# Patient Record
Sex: MALE | Race: BLACK OR AFRICAN AMERICAN | Employment: UNEMPLOYED | RURAL
[De-identification: names, ages, dates, MRNs, and addresses within clinical notes are randomized per-mention and may not be internally consistent; named-entity substitution may affect disease eponyms.]

---

## 2017-02-10 DIAGNOSIS — M17.12 PRIMARY OSTEOARTHRITIS OF LEFT KNEE: ICD-10-CM

## 2017-02-13 RX ORDER — TRAMADOL HYDROCHLORIDE AND ACETAMINOPHEN 37.5; 325 MG/1; MG/1
TABLET ORAL
Qty: 60 TAB | Refills: 0 | Status: SHIPPED | OUTPATIENT
Start: 2017-02-13 | End: 2017-08-15

## 2017-04-17 ENCOUNTER — OFFICE VISIT (OUTPATIENT)
Dept: FAMILY MEDICINE CLINIC | Age: 57
End: 2017-04-17

## 2017-04-17 VITALS
WEIGHT: 165 LBS | OXYGEN SATURATION: 96 % | TEMPERATURE: 98.2 F | DIASTOLIC BLOOD PRESSURE: 92 MMHG | SYSTOLIC BLOOD PRESSURE: 144 MMHG | HEART RATE: 56 BPM | BODY MASS INDEX: 25.9 KG/M2 | HEIGHT: 67 IN | RESPIRATION RATE: 18 BRPM

## 2017-04-17 DIAGNOSIS — R73.03 PRE-DIABETES: ICD-10-CM

## 2017-04-17 DIAGNOSIS — I10 ESSENTIAL HYPERTENSION: Primary | ICD-10-CM

## 2017-04-17 DIAGNOSIS — E78.5 HYPERLIPIDEMIA, UNSPECIFIED HYPERLIPIDEMIA TYPE: ICD-10-CM

## 2017-04-17 DIAGNOSIS — Z23 ENCOUNTER FOR IMMUNIZATION: ICD-10-CM

## 2017-04-17 NOTE — PROGRESS NOTES
Reviewed record in preparation for visit and have necessary documentation  Pt did not bring medication to office visit for review  Opportunity was given for questions  Goals that were addressed and/or need to be completed after this appointment include   Health Maintenance Due   Topic Date Due    Pneumococcal 19-64 Medium Risk (1 of 1 - PPSV23) 12/27/1979    DTaP/Tdap/Td series (1 - Tdap) 12/27/1981

## 2017-04-17 NOTE — PATIENT INSTRUCTIONS

## 2017-04-18 LAB
ALBUMIN SERPL-MCNC: 4 G/DL (ref 3.5–5.5)
ALBUMIN/GLOB SERPL: 1.3 {RATIO} (ref 1.2–2.2)
ALP SERPL-CCNC: 55 IU/L (ref 39–117)
ALT SERPL-CCNC: 16 IU/L (ref 0–44)
AST SERPL-CCNC: 21 IU/L (ref 0–40)
BILIRUB SERPL-MCNC: 0.3 MG/DL (ref 0–1.2)
BUN SERPL-MCNC: 12 MG/DL (ref 6–24)
BUN/CREAT SERPL: 10 (ref 9–20)
CALCIUM SERPL-MCNC: 8.8 MG/DL (ref 8.7–10.2)
CHLORIDE SERPL-SCNC: 100 MMOL/L (ref 96–106)
CHOLEST SERPL-MCNC: 162 MG/DL (ref 100–199)
CO2 SERPL-SCNC: 25 MMOL/L (ref 18–29)
CREAT SERPL-MCNC: 1.15 MG/DL (ref 0.76–1.27)
EST. AVERAGE GLUCOSE BLD GHB EST-MCNC: 131 MG/DL
GLOBULIN SER CALC-MCNC: 3 G/DL (ref 1.5–4.5)
GLUCOSE SERPL-MCNC: 81 MG/DL (ref 65–99)
HBA1C MFR BLD: 6.2 % (ref 4.8–5.6)
HCT VFR BLD AUTO: 40.4 % (ref 37.5–51)
HDLC SERPL-MCNC: 67 MG/DL
HGB BLD-MCNC: 13 G/DL (ref 12.6–17.7)
LDLC SERPL CALC-MCNC: 70 MG/DL (ref 0–99)
POTASSIUM SERPL-SCNC: 4.1 MMOL/L (ref 3.5–5.2)
PROT SERPL-MCNC: 7 G/DL (ref 6–8.5)
SODIUM SERPL-SCNC: 136 MMOL/L (ref 134–144)
TRIGL SERPL-MCNC: 127 MG/DL (ref 0–149)
TSH SERPL DL<=0.005 MIU/L-ACNC: 0.93 UIU/ML (ref 0.45–4.5)
VLDLC SERPL CALC-MCNC: 25 MG/DL (ref 5–40)

## 2017-04-24 NOTE — PROGRESS NOTES
Chief Complaint   Patient presents with    Hypertension    Cholesterol Problem    Other     Requesting MRI to check on filter     he is a 64y.o. year old male who presents for follow up chronic medical conditions. Patient with hx of CVA with left sided hemiparesis. Patient denies HA, dizziness, SOB, CP, abdominal pain, dysuria, acute myalgias or arthralgias. Hypertension:  The patient reports:  taking medications as instructed, no medication side effects noted, no TIA's, no chest pain on exertion, no dyspnea on exertion, no swelling of ankles. Lifestyle modification/social history: sedentary  BP Readings from Last 3 Encounters:   04/17/17 (!) 144/92   12/15/16 135/84   08/31/16 132/87     Lab Results   Component Value Date/Time    Sodium 136 04/17/2017 12:42 PM    Potassium 4.1 04/17/2017 12:42 PM    Chloride 100 04/17/2017 12:42 PM    CO2 25 04/17/2017 12:42 PM    Glucose 81 04/17/2017 12:42 PM    BUN 12 04/17/2017 12:42 PM    Creatinine 1.15 04/17/2017 12:42 PM    BUN/Creatinine ratio 10 04/17/2017 12:42 PM    GFR est AA 82 04/17/2017 12:42 PM    GFR est non-AA 71 04/17/2017 12:42 PM    Calcium 8.8 04/17/2017 12:42 PM     Patient advised to log blood pressures at home 2-3 times weekly and bring to next visit. Call office as soon as possible if BP's over 140/90 on multiple occasions or with symptoms of dizziness, chest pain, shortness of breath, headache or ankle swelling. Our goal is to normalize the blood pressure to decrease the risks of strokes and heart attacks. The patient is in agreement with the plan. Hyperlipidemia      Lab Results   Component Value Date/Time    Cholesterol, total 162 04/17/2017 12:42 PM    HDL Cholesterol 67 04/17/2017 12:42 PM    LDL, calculated 70 04/17/2017 12:42 PM    Triglyceride 127 04/17/2017 12:42 PM     Lab Results   Component Value Date/Time    ALT (SGPT) 16 04/17/2017 12:42 PM    AST (SGOT) 21 04/17/2017 12:42 PM    Alk.  phosphatase 55 04/17/2017 12:42 PM Bilirubin, total 0.3 04/17/2017 12:42 PM     Our goal is to lower hyperlipidemia to decrease the risks of strokes and heart attacks      Patient Active Problem List   Diagnosis Code    HTN (hypertension) I10    H/O: CVA (cerebrovascular accident) Z80.78    Alcohol abuse F10.10    CHF (congestive heart failure) ([facility]) I50.9    Osteoarthritis, knee M17.10    Knee pain M25.569    Arthritis of knee M17.10    GERD (gastroesophageal reflux disease) K21.9    Neutropenia ([address] D70.9    PE (pulmonary embolism) I26.99    Hyperlipidemia E78.5    Paralysis ([facility]) G83.9    Cough R05     History reviewed. No pertinent surgical history. Social History     Social History    Marital status: LEGALLY      Spouse name: N/A    Number of children: N/A    Years of education: N/A     Occupational History    Not on file. Social History Main Topics    Smoking status: Former Smoker     Types: Cigars     Quit date: 1/1/2007    Smokeless tobacco: Never Used    Alcohol use Yes      Comment: beer occasionally    Drug use: No    Sexual activity: Yes     Other Topics Concern    Not on file     Social History Narrative     Family History   Problem Relation Age of Onset    Hypertension Mother      Current Outpatient Prescriptions   Medication Sig    traMADol-acetaminophen (ULTRACET) 37.5-325 mg per tablet 1-2 tabs every 8 hours as needed.   Indications: Pain    doxazosin (CARDURA) 2 mg tablet TAKE ONE TABLET BY MOUTH ONCE DAILY    baclofen (LIORESAL) 10 mg tablet TAKE ONE TABLET BY MOUTH TWICE DAILY    famotidine (PEPCID) 20 mg tablet TAKE 1 TABLET BY MOUTH ONCE A DAY AT NIGHT    triamterene-hydroCHLOROthiazide (MAXZIDE) 37.5-25 mg per tablet TAKE ONE TABLET BY MOUTH ONCE DAILY    amLODIPine (NORVASC) 10 mg tablet TAKE ONE TABLET BY MOUTH ONCE DAILY    irbesartan (AVAPRO) 150 mg tablet TAKE ONE TABLET BY MOUTH ONCE DAILY    pravastatin (PRAVACHOL) 40 mg tablet TAKE ONE TABLET BY MOUTH ONCE DAILY    naproxen (NAPROSYN) 500 mg tablet Take 1 Tab by mouth two (2) times daily (with meals).  Omeprazole delayed release (PRILOSEC D/R) 20 mg tablet Take 1 Tab by mouth daily.  cetirizine (ZYRTEC) 10 mg tablet Take 1 Tab by mouth daily as needed for Allergies. No current facility-administered medications for this visit. Allergies   Allergen Reactions    Ace Inhibitors Other (comments)     Neutropenia      Zestril [Lisinopril] Other (comments)     neutropenia       Review of Systems:  Constitutional: feeling well, denies fatigue, malaise  Skin: Negative for rash or lesion  HEENT: Negative for acute hearing or vision changes  Respiratory: Negative for cough, wheezing or SOB  Cardiovascular: Negative for chest pain, dizziness or palpitations  Gastrointestinal: Negative for nausea or abdominal pain  Genital/urinary: Negative for dysuria or voiding dysfunction  Musculoskeletal: Negative for acute myalgia or arthralgia   Neurological: see HPI, Negative for HA  Psychlogical: Negative for depression or anxiety     Vitals:    04/17/17 0910   BP: (!) 144/92   Pulse: (!) 56   Resp: 18   Temp: 98.2 °F (36.8 °C)   TempSrc: Oral   SpO2: 96%   Weight: 165 lb (74.8 kg)   Height: 5' 7\" (1.702 m)       Physical Examination:  General: Well developed, well nourished, in no acute distress  Skin: Warm and dry, no rash or lesion appreciated  Head: Normocephalic, atraumatic  Eyes: Sclera clear, EOMI  Neck: Normal range of motion  Respiratory: Clear to auscultation bilaterally with symmetrical effort  Cardiovascular: Normal S1, S2, Regular rate and rhythm  Extremities: decreased left sided strength, antalgic gait   Neurological: left hemiparesis  Psychological: Active, alert and oriented. Affect appropriate     Gorge Dixon was seen today for hypertension, cholesterol problem and other.     Diagnoses and all orders for this visit:    Essential hypertension  -     TSH 3RD GENERATION  -     HGB & HCT    Hyperlipidemia, unspecified hyperlipidemia type  -     METABOLIC PANEL, COMPREHENSIVE  -     LIPID PANEL  -     HGB & HCT    Pre-diabetes  -     METABOLIC PANEL, COMPREHENSIVE  -     HEMOGLOBIN A1C WITH EAG    Encounter for immunization  -     pneumococcal 23-valent (PNEUMOVAX 23) 25 mcg/0.5 mL injection; 0.5 mL by IntraMUSCular route once for 1 dose. I have discussed the diagnosis with the patient and the intended plan as seen in the above orders. The patient expresses understanding and agreement with our plan of care. All of the patient's questions were answered to apparent satisfaction. The patient has received an after-visit summary. The patient knows to call our office if there are any questions or concerns regarding diagnosis and treatment plans. I have discussed medication side effects and warnings with the patient as well. Follow-up Disposition:  Return in about 6 months (around 10/17/2017), or if symptoms worsen or fail to improve.

## 2017-04-25 ENCOUNTER — TELEPHONE (OUTPATIENT)
Dept: FAMILY MEDICINE CLINIC | Age: 57
End: 2017-04-25

## 2017-04-25 NOTE — TELEPHONE ENCOUNTER
Pt is calling requesting a CT scan. He needs the IVC Filter checked. Has not been checked for years. He is having SOB. He can walk from the den to the kitchen (short distance) and is out of breath.  Thanks

## 2017-06-26 DIAGNOSIS — M17.12 PRIMARY OSTEOARTHRITIS OF LEFT KNEE: ICD-10-CM

## 2017-06-26 DIAGNOSIS — I10 ESSENTIAL HYPERTENSION WITH GOAL BLOOD PRESSURE LESS THAN 140/90: ICD-10-CM

## 2017-06-27 RX ORDER — DOXAZOSIN 2 MG/1
TABLET ORAL
Qty: 30 TAB | Refills: 5 | Status: SHIPPED | OUTPATIENT
Start: 2017-06-27 | End: 2018-05-29 | Stop reason: SDUPTHER

## 2017-06-27 RX ORDER — TRAMADOL HYDROCHLORIDE AND ACETAMINOPHEN 37.5; 325 MG/1; MG/1
TABLET ORAL
Qty: 60 TAB | Refills: 0 | OUTPATIENT
Start: 2017-06-27

## 2017-06-27 NOTE — TELEPHONE ENCOUNTER
Patient needs UDS and medication contract  for any further prescriptions for tramadol.     Patient states he has appointment in August and can wait until then

## 2017-08-07 ENCOUNTER — OFFICE VISIT (OUTPATIENT)
Dept: FAMILY MEDICINE CLINIC | Age: 57
End: 2017-08-07

## 2017-08-07 VITALS
RESPIRATION RATE: 20 BRPM | TEMPERATURE: 98.3 F | BODY MASS INDEX: 25.08 KG/M2 | HEART RATE: 97 BPM | HEIGHT: 67 IN | SYSTOLIC BLOOD PRESSURE: 116 MMHG | OXYGEN SATURATION: 97 % | WEIGHT: 159.8 LBS | DIASTOLIC BLOOD PRESSURE: 81 MMHG

## 2017-08-07 DIAGNOSIS — E78.5 HYPERLIPIDEMIA, UNSPECIFIED HYPERLIPIDEMIA TYPE: ICD-10-CM

## 2017-08-07 DIAGNOSIS — M17.12 PRIMARY OSTEOARTHRITIS OF LEFT KNEE: ICD-10-CM

## 2017-08-07 DIAGNOSIS — I10 ESSENTIAL HYPERTENSION: Primary | ICD-10-CM

## 2017-08-07 DIAGNOSIS — R73.02 GLUCOSE INTOLERANCE (IMPAIRED GLUCOSE TOLERANCE): ICD-10-CM

## 2017-08-07 LAB — HBA1C MFR BLD HPLC: 5.9 %

## 2017-08-07 RX ORDER — TRAMADOL HYDROCHLORIDE AND ACETAMINOPHEN 37.5; 325 MG/1; MG/1
TABLET ORAL
Qty: 60 TAB | Refills: 0 | Status: SHIPPED | OUTPATIENT
Start: 2017-08-07 | End: 2017-08-15

## 2017-08-07 NOTE — PROGRESS NOTES
Health Maintenance Due   Topic Date Due    Pneumococcal 19-64 Medium Risk (1 of 1 - PPSV23) 12/27/1979    DTaP/Tdap/Td series (1 - Tdap) 12/27/1981    INFLUENZA AGE 9 TO ADULT  08/01/2017

## 2017-08-07 NOTE — PROGRESS NOTES
Chief Complaint   Patient presents with    Hypertension    Cholesterol Problem    Labs    Leg Pain     he is a 64y.o. year old male who presents for follow up chronic medical conditions. Patient with hx of CVA with left sided hemiparesis, HTN, HLD and left knee OA. . Patient denies HA, dizziness, SOB, CP, abdominal pain, dysuria, acute myalgias or arthralgias. He has used ultracet in past for breakthrough knee pain. [place] law, UDS, pain contract and  review discussed. Hypertension:  The patient reports:  taking medications as instructed, no medication side effects noted, no TIA's, no chest pain on exertion, no dyspnea on exertion, no swelling of ankles. Lifestyle modification/social history: sedentary     BP Readings from Last 3 Encounters:   08/07/17 116/81   04/17/17 (!) 144/92   12/15/16 135/84       Our goal is to normalize the blood pressure to decrease the risks of strokes and heart attacks. The patient is in agreement with the plan. Patient Active Problem List   Diagnosis Code    HTN (hypertension) I10    H/O: CVA (cerebrovascular accident) Z80.78    Alcohol abuse F10.10    CHF (congestive heart failure) ([facility]) I50.9    Osteoarthritis, knee M17.10    Knee pain M25.569    Arthritis of knee M17.10    GERD (gastroesophageal reflux disease) K21.9    Neutropenia ([address] D70.9    PE (pulmonary embolism) I26.99    Hyperlipidemia E78.5    Paralysis ([facility]) G83.9    Cough R05     History reviewed. No pertinent surgical history. Social History     Social History    Marital status: LEGALLY      Spouse name: N/A    Number of children: N/A    Years of education: N/A     Occupational History    Not on file.      Social History Main Topics    Smoking status: Former Smoker     Types: Cigars     Quit date: 1/1/2007    Smokeless tobacco: Never Used    Alcohol use Yes      Comment: beer occasionally    Drug use: No    Sexual activity: Yes     Other Topics Concern    Not on file Social History Narrative     Family History   Problem Relation Age of Onset    Hypertension Mother      Current Outpatient Prescriptions   Medication Sig    traMADol-acetaminophen (ULTRACET) 37.5-325 mg per tablet 1-2 tabs every 8 hours as needed. Indications: Pain    doxazosin (CARDURA) 2 mg tablet TAKE ONE TABLET BY MOUTH ONCE DAILY    irbesartan (AVAPRO) 150 mg tablet TAKE ONE TABLET BY MOUTH ONCE DAILY    famotidine (PEPCID) 20 mg tablet TAKE 1 TABLET BY MOUTH ONCE A DAY AT NIGHT    triamterene-hydroCHLOROthiazide (MAXZIDE) 37.5-25 mg per tablet TAKE ONE TABLET BY MOUTH ONCE DAILY    baclofen (LIORESAL) 10 mg tablet TAKE ONE TABLET BY MOUTH TWICE DAILY    amLODIPine (NORVASC) 10 mg tablet TAKE ONE TABLET BY MOUTH ONCE DAILY    traMADol-acetaminophen (ULTRACET) 37.5-325 mg per tablet 1-2 tabs every 8 hours as needed. Indications: Pain    pravastatin (PRAVACHOL) 40 mg tablet TAKE ONE TABLET BY MOUTH ONCE DAILY    naproxen (NAPROSYN) 500 mg tablet Take 1 Tab by mouth two (2) times daily (with meals).  Omeprazole delayed release (PRILOSEC D/R) 20 mg tablet Take 1 Tab by mouth daily.  cetirizine (ZYRTEC) 10 mg tablet Take 1 Tab by mouth daily as needed for Allergies. No current facility-administered medications for this visit.       Allergies   Allergen Reactions    Ace Inhibitors Other (comments)     Neutropenia      Zestril [Lisinopril] Other (comments)     neutropenia       Review of Systems:  Constitutional: feeling well, denies fatigue, malaise  Skin: Negative for rash or lesion  HEENT: Negative for acute hearing or vision changes  Respiratory: Negative for cough, wheezing or SOB  Cardiovascular: Negative for chest pain, dizziness or palpitations  Gastrointestinal: Negative for nausea or abdominal pain  Genital/urinary: Negative for dysuria or voiding dysfunction  Musculoskeletal: see HPI  Neurological: see HPI, Negative for HA  Psychlogical: Negative for depression or anxiety Vitals:    08/07/17 0834   BP: 116/81   Pulse: 97   Resp: 20   Temp: 98.3 °F (36.8 °C)   TempSrc: Oral   SpO2: 97%   Weight: 159 lb 12.8 oz (72.5 kg)   Height: 5' 7\" (1.702 m)       Physical Examination:  General: Well developed, well nourished, in no acute distress  Skin: Warm and dry, no rash or lesion appreciated  Head: Normocephalic, atraumatic  Eyes: Sclera clear, EOMI  Neck: Normal range of motion  Respiratory: Clear to auscultation bilaterally with symmetrical effort  Cardiovascular: Normal S1, S2, Regular rate and rhythm  Extremities: decreased left sided strength, antalgic gait   Neurological: left hemiparesis  Psychological: Active, alert and oriented. Affect appropriate     Diagnoses and all orders for this visit:    1. Essential hypertension  -     traMADol-acetaminophen (ULTRACET) 37.5-325 mg per tablet; 1-2 tabs every 8 hours as needed. Indications: Pain  -     COMPLIANCE DRUG SCREEN/PRESCRIPTION MONITORING  -     AMB POC HEMOGLOBIN A1C    2. Hyperlipidemia, unspecified hyperlipidemia type  -     traMADol-acetaminophen (ULTRACET) 37.5-325 mg per tablet; 1-2 tabs every 8 hours as needed. Indications: Pain  -     COMPLIANCE DRUG SCREEN/PRESCRIPTION MONITORING  -     AMB POC HEMOGLOBIN A1C    3. Glucose intolerance (impaired glucose tolerance)  -     traMADol-acetaminophen (ULTRACET) 37.5-325 mg per tablet; 1-2 tabs every 8 hours as needed. Indications: Pain  -     COMPLIANCE DRUG SCREEN/PRESCRIPTION MONITORING  -     AMB POC HEMOGLOBIN A1C    4. Primary osteoarthritis of left knee  -     traMADol-acetaminophen (ULTRACET) 37.5-325 mg per tablet; 1-2 tabs every 8 hours as needed. Indications: Pain  -     COMPLIANCE DRUG SCREEN/PRESCRIPTION MONITORING  -     AMB POC HEMOGLOBIN A1C         I have discussed the diagnosis with the patient and the intended plan as seen in the above orders. The patient expresses understanding and agreement with our plan of care.  All of the patient's questions were answered to apparent satisfaction. The patient has received an after-visit summary. The patient knows to call our office if there are any questions or concerns regarding diagnosis and treatment plans. I have discussed medication side effects and warnings with the patient as well.       Follow-up Disposition: Not on File

## 2017-08-07 NOTE — MR AVS SNAPSHOT
Visit Information Date & Time Provider Department Dept. Phone Encounter #  
 8/7/2017  8:20 AM [id number] Upcoming Health Maintenance Date Due Pneumococcal 19-64 Medium Risk (1 of 1 - PPSV23) 12/27/1979 DTaP/Tdap/Td series (1 - Tdap) 12/27/1981 INFLUENZA AGE 9 TO ADULT 8/1/2017 COLONOSCOPY 12/22/2020 Allergies as of 8/7/2017  Review Complete On: 8/7/2017 By: Clau Enriquez MD  
  
 Severity Noted Reaction Type Reactions Ace Inhibitors  02/28/2011    Other (comments) Neutropenia Zestril [Lisinopril]  04/09/2010   Side Effect Other (comments)  
 neutropenia Current Immunizations  Reviewed on 8/31/2016 Name Date Influenza Vaccine (Quad) PF 8/31/2016 Influenza Vaccine Split 11/10/2010 Not reviewed this visit You Were Diagnosed With   
  
 Codes Comments Essential hypertension    -  Primary ICD-10-CM: I10 
ICD-9-CM: 401.9 Hyperlipidemia, unspecified hyperlipidemia type     ICD-10-CM: E78.5 ICD-9-CM: 272.4 Glucose intolerance (impaired glucose tolerance)     ICD-10-CM: R73.02 
ICD-9-CM: 790.22 Primary osteoarthritis of left knee     ICD-10-CM: M17.12 
ICD-9-CM: 715.16 Vitals BP Pulse Temp Resp Height(growth percentile) Weight(growth percentile) 116/81 97 98.3 °F (36.8 °C) (Oral) 20 5' 7\" (1.702 m) 159 lb 12.8 oz (72.5 kg) SpO2 BMI Smoking Status 97% 25.03 kg/m2 Former Smoker Vitals History BMI and BSA Data Body Mass Index Body Surface Area 25.03 kg/m 2 1.85 m 2 Preferred Pharmacy Pharmacy Name Phone [facility] [address] [place] [address] [phone] Your Updated Medication List  
  
   
This list is accurate as of: 8/7/17  9:10 AM.  Always use your most recent med list. amLODIPine 10 mg tablet Commonly known as:  [place] TAKE ONE TABLET BY MOUTH ONCE DAILY  
  
 baclofen 10 mg tablet Commonly known as:  LIORESAL  
TAKE ONE TABLET BY MOUTH TWICE DAILY  
  
 cetirizine 10 mg tablet Commonly known as:  ZyrTEC Take 1 Tab by mouth daily as needed for Allergies. doxazosin 2 mg tablet Commonly known as:  CARDURA TAKE ONE TABLET BY MOUTH ONCE DAILY  
  
 famotidine 20 mg tablet Commonly known as:  PEPCID  
TAKE 1 TABLET BY MOUTH ONCE A DAY AT NIGHT  
  
 irbesartan 150 mg tablet Commonly known as:  AVAPRO TAKE ONE TABLET BY MOUTH ONCE DAILY  
  
 naproxen 500 mg tablet Commonly known as:  NAPROSYN Take 1 Tab by mouth two (2) times daily (with meals). Omeprazole delayed release 20 mg tablet Commonly known as:  PRILOSEC D/R Take 1 Tab by mouth daily. pravastatin 40 mg tablet Commonly known as:  PRAVACHOL  
TAKE ONE TABLET BY MOUTH ONCE DAILY * traMADol-acetaminophen 37.5-325 mg per tablet Commonly known as:  ULTRACET  
1-2 tabs every 8 hours as needed. Indications: Pain * traMADol-acetaminophen 37.5-325 mg per tablet Commonly known as:  ULTRACET  
1-2 tabs every 8 hours as needed. Indications: Pain  
  
 triamterene-hydroCHLOROthiazide 37.5-25 mg per tablet Commonly known as:  Doy Meera TAKE ONE TABLET BY MOUTH ONCE DAILY * Notice: This list has 2 medication(s) that are the same as other medications prescribed for you. Read the directions carefully, and ask your doctor or other care provider to review them with you. Prescriptions Printed Refills  
 traMADol-acetaminophen (ULTRACET) 37.5-325 mg per tablet 0 Si-2 tabs every 8 hours as needed. Indications: Pain Class: Print We Performed the Following [address] [place] [address] Street MONITORING [[id number] Custom] HEMOGLOBIN A1C WITH EAG [75824 CPT(R)] Introducing [facility]!    
 Lavern Mock introduces [employer] patient portal. Now you can access parts of your medical record, email your doctor's office, and request medication refills online. 1. In your internet browser, go to [email or web address] 2. Click on the First Time User? Click Here link in the Sign In box. You will see the New Member Sign Up page. 3. Enter your [employer] Access Code exactly as it appears below. You will not need to use this code after youve completed the sign-up process. If you do not sign up before the expiration date, you must request a new code. · [employer] Access Code: [email or web address] Expires: 11/5/2017  9:07 AM 
 
4. Enter the last four digits of your Social Security Number (xxxx) and Date of Birth (mm/dd/yyyy) as indicated and click Submit. You will be taken to the next sign-up page. 5. Create a [employer] ID. This will be your [employer] login ID and cannot be changed, so think of one that is secure and easy to remember. 6. Create a [employer] password. You can change your password at any time. 7. Enter your Password Reset Question and Answer. This can be used at a later time if you forget your password. 8. Enter your e-mail address. You will receive e-mail notification when new information is available in [address] 9. Click Sign Up. You can now view and download portions of your medical record. 10. Click the Download Summary menu link to download a portable copy of your medical information. If you have questions, please visit the Frequently Asked Questions section of the [employer] website. Remember, [employer] is NOT to be used for urgent needs. For medical emergencies, dial 911. Now available from your iPhone and Android! Please provide this summary of care documentation to your next provider. Your primary care clinician is listed as Camacho Means. If you have any questions after today's visit, please call [phone].

## 2017-08-07 NOTE — PATIENT INSTRUCTIONS
Arthritis: Care Instructions  Your Care Instructions  Arthritis, also called osteoarthritis, is a breakdown of the cartilage that cushions your joints. When the cartilage wears down, your bones rub against each other. This causes pain and stiffness. Many people have some arthritis as they age. Arthritis most often affects the joints of the spine, hands, hips, knees, or feet. You can take simple measures to protect your joints, ease your pain, and help you stay active. Follow-up care is a key part of your treatment and safety. Be sure to make and go to all appointments, and call your doctor if you are having problems. It's also a good idea to know your test results and keep a list of the medicines you take. How can you care for yourself at home? · Stay at a healthy weight. Being overweight puts extra strain on your joints. · Talk to your doctor or physical therapist about exercises that will help ease joint pain. ¨ Stretch. You may enjoy gentle forms of yoga to help keep your joints and muscles flexible. ¨ Walk instead of jog. Other types of exercise that are less stressful on the joints include riding a bicycle, swimming, delio chi, or water exercise. ¨ Lift weights. Strong muscles help reduce stress on your joints. Stronger thigh muscles, for example, take some of the stress off of the knees and hips. Learn the right way to lift weights so you do not make joint pain worse. · Take your medicines exactly as prescribed. Call your doctor if you think you are having a problem with your medicine. · Take pain medicines exactly as directed. ¨ If the doctor gave you a prescription medicine for pain, take it as prescribed. ¨ If you are not taking a prescription pain medicine, ask your doctor if you can take an over-the-counter medicine. · Use a cane, crutch, walker, or another device if you need help to get around. These can help rest your joints.  You also can use other things to make life easier, such as a higher toilet seat and padded handles on kitchen utensils. · Do not sit in low chairs, which can make it hard to get up. · Put heat or cold on your sore joints as needed. Use whichever helps you most. You also can take turns with hot and cold packs. ¨ Apply heat 2 or 3 times a day for 20 to 30 minutes--using a heating pad, hot shower, or hot pack--to relieve pain and stiffness. ¨ Put ice or a cold pack on your sore joint for 10 to 20 minutes at a time. Put a thin cloth between the ice and your skin. When should you call for help? Call your doctor now or seek immediate medical care if:  · You have sudden swelling, warmth, or pain in any joint. · You have joint pain and a fever or rash. · You have such bad pain that you cannot use a joint. Watch closely for changes in your health, and be sure to contact your doctor if:  · You have mild joint symptoms that continue even with more than 6 weeks of care at home. · You have stomach pain or other problems with your medicine. Where can you learn more? Go to http://maico-leonora.info/. Enter K605 in the search box to learn more about \"Arthritis: Care Instructions. \"  Current as of: November 28, 2016  Content Version: 11.3  © 0208-6680 [employer]. Care instructions adapted under license by [employer] (which disclaims liability or warranty for this information). If you have questions about a medical condition or this instruction, always ask your healthcare professional. [place] [address] any warranty or liability for your use of this information.

## 2017-08-15 LAB — DRUGS UR: NORMAL

## 2017-11-16 RX ORDER — IRBESARTAN 150 MG/1
TABLET ORAL
Qty: 30 TAB | Refills: 5 | Status: SHIPPED | OUTPATIENT
Start: 2017-11-16 | End: 2018-05-29 | Stop reason: SDUPTHER

## 2017-11-16 RX ORDER — AMLODIPINE BESYLATE 10 MG/1
TABLET ORAL
Qty: 30 TAB | Refills: 5 | Status: SHIPPED | OUTPATIENT
Start: 2017-11-16 | End: 2018-05-29 | Stop reason: SDUPTHER

## 2017-11-16 RX ORDER — FAMOTIDINE 20 MG/1
TABLET, FILM COATED ORAL
Qty: 30 TAB | Refills: 5 | Status: SHIPPED | OUTPATIENT
Start: 2017-11-16 | End: 2018-05-29 | Stop reason: SDUPTHER

## 2017-11-27 ENCOUNTER — OFFICE VISIT (OUTPATIENT)
Dept: FAMILY MEDICINE CLINIC | Age: 57
End: 2017-11-27

## 2017-11-27 VITALS
DIASTOLIC BLOOD PRESSURE: 81 MMHG | RESPIRATION RATE: 18 BRPM | HEIGHT: 67 IN | BODY MASS INDEX: 24.48 KG/M2 | TEMPERATURE: 99.2 F | OXYGEN SATURATION: 96 % | HEART RATE: 79 BPM | WEIGHT: 156 LBS | SYSTOLIC BLOOD PRESSURE: 121 MMHG

## 2017-11-27 DIAGNOSIS — Z86.73 H/O: CVA (CEREBROVASCULAR ACCIDENT): ICD-10-CM

## 2017-11-27 DIAGNOSIS — I10 ESSENTIAL HYPERTENSION: ICD-10-CM

## 2017-11-27 DIAGNOSIS — R73.01 IFG (IMPAIRED FASTING GLUCOSE): ICD-10-CM

## 2017-11-27 DIAGNOSIS — M17.12 PRIMARY OSTEOARTHRITIS OF LEFT KNEE: ICD-10-CM

## 2017-11-27 DIAGNOSIS — Z23 ENCOUNTER FOR IMMUNIZATION: ICD-10-CM

## 2017-11-27 DIAGNOSIS — J06.9 VIRAL UPPER RESPIRATORY TRACT INFECTION: Primary | ICD-10-CM

## 2017-11-27 DIAGNOSIS — E78.5 HYPERLIPIDEMIA, UNSPECIFIED HYPERLIPIDEMIA TYPE: ICD-10-CM

## 2017-11-27 RX ORDER — PROMETHAZINE HYDROCHLORIDE AND CODEINE PHOSPHATE 6.25; 1 MG/5ML; MG/5ML
5 SOLUTION ORAL
Qty: 240 ML | Refills: 0 | Status: SHIPPED | OUTPATIENT
Start: 2017-11-27 | End: 2018-02-20

## 2017-11-27 NOTE — MR AVS SNAPSHOT
Visit Information Date & Time Provider Department Dept. Phone Encounter #  
 11/27/2017 10:00 AM Donnette Gitelman, MD 7 [facility] [id number] Follow-up Instructions Return in about 6 months (around 5/27/2018). Upcoming Health Maintenance Date Due Pneumococcal 19-64 Medium Risk (1 of 1 - PPSV23) 12/27/1979 DTaP/Tdap/Td series (1 - Tdap) 12/27/1981 Influenza Age 5 to Adult 8/1/2017 COLONOSCOPY 12/22/2020 Allergies as of 11/27/2017  Review Complete On: 11/27/2017 By: Brendan Villeda Severity Noted Reaction Type Reactions Ace Inhibitors  02/28/2011    Other (comments) Neutropenia Zestril [Lisinopril]  04/09/2010   Side Effect Other (comments)  
 neutropenia Current Immunizations  Reviewed on 8/31/2016 Name Date Influenza Vaccine (Quad) PF 8/31/2016 Influenza Vaccine Split 11/10/2010 Not reviewed this visit You Were Diagnosed With   
  
 Codes Comments Viral upper respiratory tract infection    -  Primary ICD-10-CM: J06.9, B97.89 ICD-9-CM: 465.9 Essential hypertension     ICD-10-CM: I10 
ICD-9-CM: 401.9 Hyperlipidemia, unspecified hyperlipidemia type     ICD-10-CM: E78.5 ICD-9-CM: 272.4 H/O: CVA (cerebrovascular accident)     ICD-10-CM: Z86.73 
ICD-9-CM: V12.54 IFG (impaired fasting glucose)     ICD-10-CM: R73.01 
ICD-9-CM: 790.21 Primary osteoarthritis of left knee     ICD-10-CM: M17.12 
ICD-9-CM: 715.16 Vitals BP Pulse Temp Resp Height(growth percentile) Weight(growth percentile) 121/81 (BP 1 Location: Right arm, BP Patient Position: Sitting) 79 99.2 °F (37.3 °C) (Oral) 18 5' 7\" (1.702 m) 156 lb (70.8 kg) SpO2 BMI Smoking Status 96% 24.43 kg/m2 Former Smoker Vitals History BMI and BSA Data Body Mass Index Body Surface Area  
 24.43 kg/m 2 1.83 m 2 Preferred Pharmacy Pharmacy Name Phone [facility] [address] [address] [phone] Your Updated Medication List  
  
   
This list is accurate as of: 11/27/17 11:16 AM.  Always use your most recent med list. amLODIPine 10 mg tablet Commonly known as:  Elyn Coffer TAKE ONE TABLET BY MOUTH ONCE DAILY  
  
 baclofen 10 mg tablet Commonly known as:  LIORESAL  
TAKE ONE TABLET BY MOUTH TWICE DAILY  
  
 cetirizine 10 mg tablet Commonly known as:  ZyrTEC Take 1 Tab by mouth daily as needed for Allergies. diph,Pertuss(Acell),Tet Vac-PF 2 Lf-(2.5-5-3-5 mcg)-5Lf/0.5 mL susp Commonly known as:  ADACEL  
0.5 mL by IntraMUSCular route once for 1 dose. doxazosin 2 mg tablet Commonly known as:  CARDURA TAKE ONE TABLET BY MOUTH ONCE DAILY  
  
 famotidine 20 mg tablet Commonly known as:  PEPCID  
TAKE 1 TABLET BY MOUTH ONCE A DAY AT NIGHT  
  
 irbesartan 150 mg tablet Commonly known as:  AVAPRO TAKE ONE TABLET BY MOUTH ONCE DAILY  
  
 naproxen 500 mg tablet Commonly known as:  NAPROSYN Take 1 Tab by mouth two (2) times daily (with meals). Omeprazole delayed release 20 mg tablet Commonly known as:  PRILOSEC D/R Take 1 Tab by mouth daily. pravastatin 40 mg tablet Commonly known as:  PRAVACHOL  
TAKE ONE TABLET BY MOUTH ONCE DAILY promethazine-codeine 6.25-10 mg/5 mL syrup Commonly known as:  PHENERGAN with CODEINE Take 5 mL by mouth every six (6) hours as needed for Cough. Max Daily Amount: 20 mL. triamterene-hydroCHLOROthiazide 37.5-25 mg per tablet Commonly known as:  Corinn Rave TAKE ONE TABLET BY MOUTH ONCE DAILY Prescriptions Printed Refills  
 promethazine-codeine (PHENERGAN WITH CODEINE) 6.25-10 mg/5 mL syrup 0 Sig: Take 5 mL by mouth every six (6) hours as needed for Cough. Max Daily Amount: 20 mL. Class: Print  Route: Oral  
 diph,Pertuss,Acell,,Tet Vac-PF (ADACEL) 2 Lf-(2.5-5-3-5 mcg)-5Lf/0.5 mL susp 0  
 Si.5 mL by IntraMUSCular route once for 1 dose. Class: Print Route: IntraMUSCular Follow-up Instructions Return in about 6 months (around 2018). To-Do List   
 2017 Lab:  CBC W/O DIFF   
  
 2017 Lab:  HEMOGLOBIN A1C WITH EAG   
  
 2017 Lab:  LIPID PANEL   
  
 2017 Lab:  METABOLIC PANEL, COMPREHENSIVE   
  
 2017 Lab:  TSH 3RD GENERATION Patient Instructions DASH Diet: Care Instructions Your Care Instructions The DASH diet is an eating plan that can help lower your blood pressure. DASH stands for Dietary Approaches to Stop Hypertension. Hypertension is high blood pressure. The DASH diet focuses on eating foods that are high in calcium, potassium, and magnesium. These nutrients can lower blood pressure. The foods that are highest in these nutrients are fruits, vegetables, low-fat dairy products, nuts, seeds, and legumes. But taking calcium, potassium, and magnesium supplements instead of eating foods that are high in those nutrients does not have the same effect. The DASH diet also includes whole grains, fish, and poultry. The DASH diet is one of several lifestyle changes your doctor may recommend to lower your high blood pressure. Your doctor may also want you to decrease the amount of sodium in your diet. Lowering sodium while following the DASH diet can lower blood pressure even further than just the DASH diet alone. Follow-up care is a key part of your treatment and safety. Be sure to make and go to all appointments, and call your doctor if you are having problems. It's also a good idea to know your test results and keep a list of the medicines you take. How can you care for yourself at home? Following the DASH diet · Eat 4 to 5 servings of fruit each day.  A serving is 1 medium-sized piece of fruit, ½ cup chopped or canned fruit, 1/4 cup dried fruit, or 4 ounces (½ cup) of fruit juice. Choose fruit more often than fruit juice. · Eat 4 to 5 servings of vegetables each day. A serving is 1 cup of lettuce or raw leafy vegetables, ½ cup of chopped or cooked vegetables, or 4 ounces (½ cup) of vegetable juice. Choose vegetables more often than vegetable juice. · Get 2 to 3 servings of low-fat and fat-free dairy each day. A serving is 8 ounces of milk, 1 cup of yogurt, or 1 ½ ounces of cheese. · Eat 6 to 8 servings of grains each day. A serving is 1 slice of bread, 1 ounce of dry cereal, or ½ cup of cooked rice, pasta, or cooked cereal. Try to choose whole-grain products as much as possible. · Limit lean meat, poultry, and fish to 2 servings each day. A serving is 3 ounces, about the size of a deck of cards. · Eat 4 to 5 servings of nuts, seeds, and legumes (cooked dried beans, lentils, and split peas) each week. A serving is 1/3 cup of nuts, 2 tablespoons of seeds, or ½ cup of cooked beans or peas. · Limit fats and oils to 2 to 3 servings each day. A serving is 1 teaspoon of vegetable oil or 2 tablespoons of salad dressing. · Limit sweets and added sugars to 5 servings or less a week. A serving is 1 tablespoon jelly or jam, ½ cup sorbet, or 1 cup of lemonade. · Eat less than 2,300 milligrams (mg) of sodium a day. If you limit your sodium to 1,500 mg a day, you can lower your blood pressure even more. Tips for success · Start small. Do not try to make dramatic changes to your diet all at once. You might feel that you are missing out on your favorite foods and then be more likely to not follow the plan. Make small changes, and stick with them. Once those changes become habit, add a few more changes. · Try some of the following: ¨ Make it a goal to eat a fruit or vegetable at every meal and at snacks. This will make it easy to get the recommended amount of fruits and vegetables each day. ¨ Try yogurt topped with fruit and nuts for a snack or healthy dessert. ¨ Add lettuce, tomato, cucumber, and onion to sandwiches. ¨ Combine a ready-made pizza crust with low-fat mozzarella cheese and lots of vegetable toppings. Try using tomatoes, squash, spinach, broccoli, carrots, cauliflower, and onions. ¨ Have a variety of cut-up vegetables with a low-fat dip as an appetizer instead of chips and dip. ¨ Sprinkle sunflower seeds or chopped almonds over salads. Or try adding chopped walnuts or almonds to cooked vegetables. ¨ Try some vegetarian meals using beans and peas. Add garbanzo or kidney beans to salads. Make burritos and tacos with mashed benoit beans or black beans. Where can you learn more? Go to http://maico-leonora.info/. Enter H790 in the search box to learn more about \"DASH Diet: Care Instructions. \" Current as of: September 21, 2016 Content Version: 11.4 © 9336-0906 [employer]. Care instructions adapted under license by [employer] (which disclaims liability or warranty for this information). If you have questions about a medical condition or this instruction, always ask your healthcare professional. [address] any warranty or liability for your use of this information. Introducing [facility]! Suellen Shoemaker introduces Housebites patient portal. Now you can access parts of your medical record, email your doctor's office, and request medication refills online. 1. In your internet browser, go to [email or web address] 2. Click on the First Time User? Click Here link in the Sign In box. You will see the New Member Sign Up page. 3. Enter your Housebites Access Code exactly as it appears below. You will not need to use this code after youve completed the sign-up process. If you do not sign up before the expiration date, you must request a new code. · Housebites Access Code: [email or web address] Expires: 2/25/2018 11:16 AM 
 
 4. Enter the last four digits of your Social Security Number (xxxx) and Date of Birth (mm/dd/yyyy) as indicated and click Submit. You will be taken to the next sign-up page. 5. Create a iLyngo ID. This will be your iLyngo login ID and cannot be changed, so think of one that is secure and easy to remember. 6. Create a iLyngo password. You can change your password at any time. 7. Enter your Password Reset Question and Answer. This can be used at a later time if you forget your password. 8. Enter your e-mail address. You will receive e-mail notification when new information is available in [address] 9. Click Sign Up. You can now view and download portions of your medical record. 10. Click the Download Summary menu link to download a portable copy of your medical information. If you have questions, please visit the Frequently Asked Questions section of the iLyngo website. Remember, iLyngo is NOT to be used for urgent needs. For medical emergencies, dial 911. Now available from your iPhone and Android! Please provide this summary of care documentation to your next provider. Your primary care clinician is listed as Kb Moncada. If you have any questions after today's visit, please call [phone].

## 2017-11-27 NOTE — PATIENT INSTRUCTIONS

## 2017-11-27 NOTE — PROGRESS NOTES
Chief Complaint   Patient presents with    Hypertension    Cholesterol Problem    Immunization/Injection     flu     he is a 64y.o. year old male who presents for follow up chronic medical conditions. Patient with hx of CVA with left sided hemiparesis. He complains of persistent cough since experiencing URI symptoms 2 weeks ago. Patient denies HA, dizziness, SOB, CP, abdominal pain, dysuria, acute myalgias or arthralgias. Hypertension:  The patient reports:  taking medications as instructed, no medication side effects noted, no TIA's, no chest pain on exertion, no dyspnea on exertion, no swelling of ankles. Lifestyle modification/social history: sedentary     BP Readings from Last 3 Encounters:   11/27/17 121/81   08/07/17 116/81   04/17/17 (!) 144/92     Patient advised to log blood pressures at home weekly and bring to next visit. Call office as soon as possible if BP's over 140/90 on multiple occasions or with symptoms of dizziness, chest pain, shortness of breath, headache or ankle swelling. Our goal is to normalize the blood pressure to decrease the risks of strokes and heart attacks. The patient is in agreement with the plan. Patient Active Problem List   Diagnosis Code    HTN (hypertension) I10    H/O: CVA (cerebrovascular accident) Z80.78    Alcohol abuse F10.10    CHF (congestive heart failure) ([facility]) I50.9    Osteoarthritis, knee M17.10    Knee pain M25.569    Arthritis of knee M17.10    GERD (gastroesophageal reflux disease) K21.9    Neutropenia ([address] D70.9    PE (pulmonary embolism) I26.99    Hyperlipidemia E78.5    Paralysis ([facility]) G83.9    Cough R05     History reviewed. No pertinent surgical history. Social History     Social History    Marital status: LEGALLY      Spouse name: N/A    Number of children: N/A    Years of education: N/A     Occupational History    Not on file.      Social History Main Topics    Smoking status: Former Smoker     Types: Cigars Quit date: 1/1/2007    Smokeless tobacco: Never Used    Alcohol use Yes      Comment: beer occasionally    Drug use: No    Sexual activity: Yes     Other Topics Concern    Not on file     Social History Narrative     Family History   Problem Relation Age of Onset    Hypertension Mother      Current Outpatient Prescriptions   Medication Sig    promethazine-codeine (PHENERGAN WITH CODEINE) 6.25-10 mg/5 mL syrup Take 5 mL by mouth every six (6) hours as needed for Cough. Max Daily Amount: 20 mL.  diph,Pertuss,Acell,,Tet Vac-PF (ADACEL) 2 Lf-(2.5-5-3-5 mcg)-5Lf/0.5 mL susp 0.5 mL by IntraMUSCular route once for 1 dose.  amLODIPine (NORVASC) 10 mg tablet TAKE ONE TABLET BY MOUTH ONCE DAILY    famotidine (PEPCID) 20 mg tablet TAKE 1 TABLET BY MOUTH ONCE A DAY AT NIGHT    irbesartan (AVAPRO) 150 mg tablet TAKE ONE TABLET BY MOUTH ONCE DAILY    doxazosin (CARDURA) 2 mg tablet TAKE ONE TABLET BY MOUTH ONCE DAILY    triamterene-hydroCHLOROthiazide (MAXZIDE) 37.5-25 mg per tablet TAKE ONE TABLET BY MOUTH ONCE DAILY    baclofen (LIORESAL) 10 mg tablet TAKE ONE TABLET BY MOUTH TWICE DAILY    pravastatin (PRAVACHOL) 40 mg tablet TAKE ONE TABLET BY MOUTH ONCE DAILY    naproxen (NAPROSYN) 500 mg tablet Take 1 Tab by mouth two (2) times daily (with meals).  Omeprazole delayed release (PRILOSEC D/R) 20 mg tablet Take 1 Tab by mouth daily.  cetirizine (ZYRTEC) 10 mg tablet Take 1 Tab by mouth daily as needed for Allergies. No current facility-administered medications for this visit.       Allergies   Allergen Reactions    Ace Inhibitors Other (comments)     Neutropenia      Zestril [Lisinopril] Other (comments)     neutropenia       Review of Systems:  Constitutional: feeling well, denies fatigue, malaise  Skin: Negative for rash or lesion  HEENT: see HPI, Negative for acute hearing or vision changes  Respiratory: c/o cough, Negative for wheezing or SOB  Cardiovascular: Negative for chest pain, dizziness or palpitations  Gastrointestinal: Negative for nausea or abdominal pain  Genital/urinary: Negative for dysuria or voiding dysfunction  Musculoskeletal: Negative for acute myalgia or arthralgia   Neurological: see HPI, Negative for HA  Psychlogical: Negative for depression or anxiety     Vitals:    11/27/17 0952   BP: 121/81   Pulse: 79   Resp: 18   Temp: 99.2 °F (37.3 °C)   TempSrc: Oral   SpO2: 96%   Weight: 156 lb (70.8 kg)   Height: 5' 7\" (1.702 m)       Physical Examination:  General: Well developed, well nourished, in no acute distress  Skin: Warm and dry, no rash or lesion appreciated  Head: Normocephalic, atraumatic  Eyes: Sclera clear, EOMI  Neck: Normal range of motion  Respiratory: Clear to auscultation bilaterally with symmetrical effort  Cardiovascular: Normal S1, S2, Regular rate and rhythm  Extremities: decreased left sided strength, antalgic gait   Neurological: left hemiparesis  Psychological: Active, alert and oriented. Affect appropriate     Diagnoses and all orders for this visit:    1. Viral upper respiratory tract infection  -     promethazine-codeine (PHENERGAN WITH CODEINE) 6.25-10 mg/5 mL syrup; Take 5 mL by mouth every six (6) hours as needed for Cough. Max Daily Amount: 20 mL.    2. Essential hypertension  -     METABOLIC PANEL, COMPREHENSIVE; Future  -     CBC W/O DIFF; Future  -     TSH 3RD GENERATION; Future    3. Hyperlipidemia, unspecified hyperlipidemia type  -     LIPID PANEL; Future  -     METABOLIC PANEL, COMPREHENSIVE; Future    4. H/O: CVA (cerebrovascular accident)    5. IFG (impaired fasting glucose)  -     HEMOGLOBIN A1C WITH EAG; Future    6. Primary osteoarthritis of left knee    7.  Encounter for immunization  -     MO IMMUNIZ ADMIN,1 SINGLE/COMB VAC/TOXOID  -     INFLUENZA VIRUS VACCINE QUADRIVALENT, PRESERVATIVE FREE SYRINGE (17813)    Other orders  -     diph,Pertuss,Acell,,Tet Vac-PF (ADACEL) 2 Lf-(2.5-5-3-5 mcg)-5Lf/0.5 mL susp; 0.5 mL by IntraMUSCular route once for 1 dose. I have discussed the diagnosis with the patient and the intended plan as seen in the above orders. The patient expresses understanding and agreement with our plan of care. All of the patient's questions were answered to apparent satisfaction. The patient has received an after-visit summary. The patient knows to call our office if there are any questions or concerns regarding diagnosis and treatment plans. I have discussed medication side effects and warnings with the patient as well. Follow-up Disposition:  Return in about 6 months (around 5/27/2018), or if symptoms worsen or fail to improve.

## 2017-11-27 NOTE — PROGRESS NOTES
Reviewed record in preparation for visit and have necessary documentation  Pt did not bring medication to office visit for review    Goals that were addressed and/or need to be completed during or after this appointment include   Health Maintenance Due   Topic Date Due    Pneumococcal 19-64 Medium Risk (1 of 1 - PPSV23) 12/27/1979    DTaP/Tdap/Td series (1 - Tdap) 12/27/1981    Influenza Age 9 to Adult  08/01/2017

## 2017-11-29 ENCOUNTER — TELEPHONE (OUTPATIENT)
Dept: FAMILY MEDICINE CLINIC | Age: 57
End: 2017-11-29

## 2017-11-29 DIAGNOSIS — M17.12 PRIMARY OSTEOARTHRITIS OF LEFT KNEE: Primary | ICD-10-CM

## 2017-11-29 RX ORDER — CANE TIPS
EACH MISCELLANEOUS
Qty: 1 DEVICE | Refills: 0 | Status: SHIPPED | OUTPATIENT
Start: 2017-11-29 | End: 2017-12-04 | Stop reason: SDUPTHER

## 2017-11-29 NOTE — TELEPHONE ENCOUNTER
----- Message from Otilio Maldonado sent at 11/29/2017 10:13 AM EST -----  Regarding: Dr. Yaneth Chase, wife, called to request an Rx so the pt can get another walking cane. The button on his has broke, and cant be used anymore. The Rx can be sent to the pt's normal [phone]. Best contact number is [phone].

## 2017-12-04 RX ORDER — CANE TIPS
EACH MISCELLANEOUS
Qty: 1 DEVICE | Refills: 0 | Status: SHIPPED | OUTPATIENT
Start: 2017-12-04

## 2017-12-04 NOTE — TELEPHONE ENCOUNTER
Please fax written order to [facility] per spouse Porfirio

## 2017-12-04 NOTE — TELEPHONE ENCOUNTER
Requested Prescriptions     Signed Prescriptions Disp Refills    Cane isabela 1 Device 0     Sig: Adjustable DX KUX:W57.84     Authorizing Provider: Louisa Moreno     Ordering User: Sowmya Atkins to [facility] pharmacy.

## 2017-12-19 ENCOUNTER — TELEPHONE (OUTPATIENT)
Dept: FAMILY MEDICINE CLINIC | Age: 57
End: 2017-12-19

## 2017-12-19 NOTE — TELEPHONE ENCOUNTER
----- Message from Melina Rod sent at 12/19/2017  3:39 PM EST -----  Regarding: dr Susana Harrison,  requesting a script for a cane. Best contact .

## 2018-03-21 ENCOUNTER — TELEPHONE (OUTPATIENT)
Dept: FAMILY MEDICINE CLINIC | Age: 58
End: 2018-03-21

## 2018-03-21 NOTE — TELEPHONE ENCOUNTER
[address] [facility] [address]  [facility] Office Pool                     Pt is checking on the status of a pre-authorization that [address] faxed over to the office.  Pt best contact [phone].

## 2018-03-26 ENCOUNTER — TELEPHONE (OUTPATIENT)
Dept: FAMILY MEDICINE CLINIC | Age: 58
End: 2018-03-26

## 2018-03-26 NOTE — TELEPHONE ENCOUNTER
Pt called and then gave permission for CNA to repeat everything he says to me. She stated that pt insurance company will not pay for the medication Irbesartan. He has been without this medication and wants to know can Dr. Steven Glover and see what similar medication the insurance company will pay for.      Contact number is: [phone]

## 2018-05-29 ENCOUNTER — OFFICE VISIT (OUTPATIENT)
Dept: FAMILY MEDICINE CLINIC | Age: 58
End: 2018-05-29

## 2018-05-29 VITALS
SYSTOLIC BLOOD PRESSURE: 93 MMHG | OXYGEN SATURATION: 96 % | BODY MASS INDEX: 24.83 KG/M2 | HEIGHT: 67 IN | RESPIRATION RATE: 18 BRPM | DIASTOLIC BLOOD PRESSURE: 61 MMHG | HEART RATE: 94 BPM | TEMPERATURE: 97.6 F | WEIGHT: 158.2 LBS

## 2018-05-29 DIAGNOSIS — I10 ESSENTIAL HYPERTENSION WITH GOAL BLOOD PRESSURE LESS THAN 140/90: ICD-10-CM

## 2018-05-29 DIAGNOSIS — I10 ESSENTIAL HYPERTENSION WITH GOAL BLOOD PRESSURE LESS THAN 140/90: Primary | ICD-10-CM

## 2018-05-29 DIAGNOSIS — L29.9 PRURITIC CONDITION: ICD-10-CM

## 2018-05-29 DIAGNOSIS — I69.954 HEMIPARESIS OF LEFT NONDOMINANT SIDE AS LATE EFFECT OF CEREBROVASCULAR DISEASE, UNSPECIFIED CEREBROVASCULAR DISEASE TYPE (HCC): ICD-10-CM

## 2018-05-29 DIAGNOSIS — Z86.73 H/O: CVA (CEREBROVASCULAR ACCIDENT): ICD-10-CM

## 2018-05-29 DIAGNOSIS — R73.01 IFG (IMPAIRED FASTING GLUCOSE): ICD-10-CM

## 2018-05-29 DIAGNOSIS — K21.9 GASTROESOPHAGEAL REFLUX DISEASE WITHOUT ESOPHAGITIS: Chronic | ICD-10-CM

## 2018-05-29 DIAGNOSIS — E78.5 HYPERLIPIDEMIA, UNSPECIFIED HYPERLIPIDEMIA TYPE: ICD-10-CM

## 2018-05-29 DIAGNOSIS — G89.29 CHRONIC PAIN OF LEFT KNEE: ICD-10-CM

## 2018-05-29 DIAGNOSIS — M25.562 CHRONIC PAIN OF LEFT KNEE: ICD-10-CM

## 2018-05-29 RX ORDER — HYDROXYZINE 25 MG/1
25 TABLET, FILM COATED ORAL
Qty: 30 TAB | Refills: 0 | Status: SHIPPED | OUTPATIENT
Start: 2018-05-29 | End: 2018-07-15 | Stop reason: SDUPTHER

## 2018-05-29 RX ORDER — AMLODIPINE BESYLATE 10 MG/1
TABLET ORAL
Qty: 90 TAB | Refills: 1 | Status: SHIPPED | OUTPATIENT
Start: 2018-05-29 | End: 2019-06-20 | Stop reason: SDUPTHER

## 2018-05-29 RX ORDER — BACLOFEN 10 MG/1
TABLET ORAL
Qty: 180 TAB | Refills: 1 | Status: SHIPPED | OUTPATIENT
Start: 2018-05-29 | End: 2019-05-09 | Stop reason: SDUPTHER

## 2018-05-29 RX ORDER — FAMOTIDINE 20 MG/1
TABLET, FILM COATED ORAL
Qty: 90 TAB | Refills: 1 | Status: SHIPPED | OUTPATIENT
Start: 2018-05-29 | End: 2019-01-22 | Stop reason: SDUPTHER

## 2018-05-29 RX ORDER — PRAVASTATIN SODIUM 40 MG/1
TABLET ORAL
Qty: 90 TAB | Refills: 1 | Status: SHIPPED | OUTPATIENT
Start: 2018-05-29 | End: 2018-12-26 | Stop reason: SDUPTHER

## 2018-05-29 RX ORDER — IRBESARTAN 150 MG/1
TABLET ORAL
Qty: 90 TAB | Refills: 1 | Status: SHIPPED | OUTPATIENT
Start: 2018-05-29 | End: 2018-11-23 | Stop reason: SDUPTHER

## 2018-05-29 RX ORDER — TRIAMTERENE/HYDROCHLOROTHIAZID 37.5-25 MG
TABLET ORAL
Qty: 90 TAB | Refills: 1 | Status: SHIPPED | OUTPATIENT
Start: 2018-05-29 | End: 2018-12-20 | Stop reason: SDUPTHER

## 2018-05-29 RX ORDER — DOXAZOSIN 2 MG/1
TABLET ORAL
Qty: 90 TAB | Refills: 1 | Status: SHIPPED | OUTPATIENT
Start: 2018-05-29 | End: 2019-02-06 | Stop reason: SDUPTHER

## 2018-05-29 RX ORDER — NAPROXEN 500 MG/1
500 TABLET ORAL 2 TIMES DAILY WITH MEALS
Qty: 180 TAB | Refills: 0 | Status: SHIPPED | OUTPATIENT
Start: 2018-05-29 | End: 2018-11-29 | Stop reason: ALTCHOICE

## 2018-05-29 NOTE — PROGRESS NOTES
Reviewed record in preparation for visit and have necessary documentation  Pt did not bring medication to office visit for review  Goals that were addressed and/or need to be completed during or after this appointment include   Health Maintenance Due   Topic Date Due    Pneumococcal 19-64 Medium Risk (1 of 1 - PPSV23) 12/27/1979    DTaP/Tdap/Td series (1 - Tdap) 12/27/1981

## 2018-05-29 NOTE — MR AVS SNAPSHOT
[address] [address] [address] [address] 
[phone] Patient: Adrianl Getting MRN: [id number] :1960 Visit Information Date & Time Provider Department Dept. Phone Encounter #  
 2018 10:00 AM Clau Enriquez MD [address] [place], [place] [id number] Upcoming Health Maintenance Date Due Pneumococcal 19-64 Medium Risk (1 of 1 - PPSV23) 1979 DTaP/Tdap/Td series (1 - Tdap) 1981 Influenza Age 5 to Adult 2018 COLONOSCOPY 2020 Allergies as of 2018  Review Complete On: 2018 By: Clau Enriquez MD  
  
 Severity Noted Reaction Type Reactions Ace Inhibitors  2011    Other (comments) Neutropenia Zestril [Lisinopril]  2010   Side Effect Other (comments)  
 neutropenia Current Immunizations  Reviewed on 2017 Name Date Influenza Vaccine (Quad) PF 2017, 2016 Influenza Vaccine Split 11/10/2010 Not reviewed this visit You Were Diagnosed With   
  
 Codes Comments Essential hypertension with goal blood pressure less than 140/90    -  Primary ICD-10-CM: I10 
ICD-9-CM: 401.9 Hyperlipidemia, unspecified hyperlipidemia type     ICD-10-CM: E78.5 ICD-9-CM: 272.4 Gastroesophageal reflux disease without esophagitis     ICD-10-CM: K21.9 ICD-9-CM: 530.81   
 H/O: CVA (cerebrovascular accident)     ICD-10-CM: Z86.73 
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   
 IFG (impaired fasting glucose)     ICD-10-CM: R73.01 
ICD-9-CM: 790.21 Chronic pain of left knee     ICD-10-CM: M25.562, G89.29 ICD-9-CM: 719.46, 338.29 Pruritic condition     ICD-10-CM: L29.9 ICD-9-CM: 698.9 Vitals BP Pulse Temp Resp Height(growth percentile) Weight(growth percentile) 93/61 (BP 1 Location: Left arm, BP Patient Position: Sitting) 94 97.6 °F (36.4 °C) (Oral) 18 5' 7\" (1.702 m) 158 lb 3.2 oz (71.8 kg) SpO2 BMI Smoking Status 96% 24.78 kg/m2 Former Smoker Vitals History BMI and BSA Data Body Mass Index Body Surface Area 24.78 kg/m 2 1.84 m 2 Preferred Pharmacy Pharmacy Name Phone [address] [place] [address] [phone] Your Updated Medication List  
  
   
This list is accurate as of 5/29/18 10:13 AM.  Always use your most recent med list. amLODIPine 10 mg tablet Commonly known as:  Leward Bunch TAKE ONE TABLET BY MOUTH ONCE DAILY  
  
 baclofen 10 mg tablet Commonly known as:  LIORESAL  
TAKE ONE TABLET BY MOUTH TWICE DAILY Ryley Gutierrez Adjustable DX Code:Z86.73  
  
 doxazosin 2 mg tablet Commonly known as:  CARDURA TAKE ONE TABLET BY MOUTH ONCE DAILY  
  
 famotidine 20 mg tablet Commonly known as:  PEPCID  
TAKE 1 TABLET BY MOUTH ONCE A DAY AT NIGHT  
  
 hydrOXYzine HCl 25 mg tablet Commonly known as:  ATARAX Take 1 Tab by mouth every eight (8) hours as needed for Itching for up to 10 days. irbesartan 150 mg tablet Commonly known as:  AVAPRO TAKE ONE TABLET BY MOUTH ONCE DAILY  
  
 naproxen 500 mg tablet Commonly known as:  NAPROSYN Take 1 Tab by mouth two (2) times daily (with meals). As needed. Omeprazole delayed release 20 mg tablet Commonly known as:  PRILOSEC D/R Take 1 Tab by mouth daily. pravastatin 40 mg tablet Commonly known as:  PRAVACHOL  
TAKE ONE TABLET BY MOUTH ONCE DAILY  
  
 triamterene-hydroCHLOROthiazide 37.5-25 mg per tablet Commonly known as:  Daniel Feeling TAKE ONE TABLET BY MOUTH ONCE DAILY Prescriptions Sent to Pharmacy Refills  
 amLODIPine (NORVASC) 10 mg tablet 1 Sig: TAKE ONE TABLET BY MOUTH ONCE DAILY  Class: Normal  
 Pharmacy: [address] [place] [address] Ph #: [phone]  
 baclofen (LIORESAL) 10 mg tablet 1 Sig: TAKE ONE TABLET BY MOUTH TWICE DAILY Class: Normal  
 Pharmacy: [address] [place] [address] Ph #: [phone]  
 doxazosin (CARDURA) 2 mg tablet 1 Sig: TAKE ONE TABLET BY MOUTH ONCE DAILY Class: Normal  
 Pharmacy: [address] [place] [address] Ph #: [phone]  
 famotidine (PEPCID) 20 mg tablet 1 Sig: TAKE 1 TABLET BY MOUTH ONCE A DAY AT NIGHT Class: Normal  
 Pharmacy: [address] [place] [address] Ph #: [phone]  
 irbesartan (AVAPRO) 150 mg tablet 1 Sig: TAKE ONE TABLET BY MOUTH ONCE DAILY Class: Normal  
 Pharmacy: [address] [place] [address] Ph #: [phone]  
 pravastatin (PRAVACHOL) 40 mg tablet 1 Sig: TAKE ONE TABLET BY MOUTH ONCE DAILY Class: Normal  
 Pharmacy: [address] [place] [address] Ph #: [phone]  
 triamterene-hydroCHLOROthiazide (MAXZIDE) 37.5-25 mg per tablet 1 Sig: TAKE ONE TABLET BY MOUTH ONCE DAILY Class: Normal  
 Pharmacy: [address] [place] [address] Ph #: [phone]  
 naproxen (NAPROSYN) 500 mg tablet 0 Sig: Take 1 Tab by mouth two (2) times daily (with meals). As needed. Class: Normal  
 Pharmacy: [address] [place] [address] Ph #: [phone] Route: Oral  
 hydrOXYzine HCl (ATARAX) 25 mg tablet 0 Sig: Take 1 Tab by mouth every eight (8) hours as needed for Itching for up to 10 days. Class: Normal  
 Pharmacy: [address] [place] [address] Ph #: [phone] Route: Oral  
  
To-Do List   
 05/29/2018 Lab:  CBC WITH AUTOMATED DIFF   
  
 05/29/2018 Lab:  HEMOGLOBIN A1C WITH EAG   
  
 05/29/2018 Lab: LIPID PANEL   
  
 05/29/2018 Lab:  MAGNESIUM   
  
 05/29/2018 Lab:  METABOLIC PANEL, COMPREHENSIVE   
  
 05/29/2018 Lab:  TSH 3RD GENERATION   
  
 05/29/2018 Lab:  VITAMIN B12 Introducing [facility] SERVICES! [employer] introduces [employer] patient portal. Now you can access parts of your medical record, email your doctor's office, and request medication refills online. 1. In your internet browser, go to [email or web address] 2. Click on the First Time User? Click Here link in the Sign In box. You will see the New Member Sign Up page. 3. Enter your [employer] Access Code exactly as it appears below. You will not need to use this code after youve completed the sign-up process. If you do not sign up before the expiration date, you must request a new code. · [employer] Access Code: [email or web address] Expires: 8/27/2018  9:31 AM 
 
4. Enter the last four digits of your Social Security Number (xxxx) and Date of Birth (mm/dd/yyyy) as indicated and click Submit. You will be taken to the next sign-up page. 5. Create a [employer] ID. This will be your [employer] login ID and cannot be changed, so think of one that is secure and easy to remember. 6. Create a [employer] password. You can change your password at any time. 7. Enter your Password Reset Question and Answer. This can be used at a later time if you forget your password. 8. Enter your e-mail address. You will receive e-mail notification when new information is available in [address] 9. Click Sign Up. You can now view and download portions of your medical record. 10. Click the Download Summary menu link to download a portable copy of your medical information. If you have questions, please visit the Frequently Asked Questions section of the [employer] website. Remember, [employer] is NOT to be used for urgent needs. For medical emergencies, dial 911. Now available from your iPhone and Android! Please provide this summary of care documentation to your next provider. Your primary care clinician is listed as Brant Gibbs. If you have any questions after today's visit, please call [phone].

## 2018-05-29 NOTE — PROGRESS NOTES
Chief Complaint   Patient presents with    Hypertension    Medication Refill     he is a 62y.o. year old male who presents for follow up chronic medical conditions. Patient with hx of CVA with left sided hemiparesis, HTN, HLD and OA. Patient denies HA, dizziness, SOB, CP, abdominal pain, dysuria, acute myalgias or arthralgias. He complains of UE pruritus and OA pain. He is due for lab work. He does need medication refills. Hypertension:  The patient reports:  taking medications as instructed, no medication side effects noted, no TIA's, no chest pain on exertion, no dyspnea on exertion, no swelling of ankles. Lifestyle modification/social history: sedentary     BP Readings from Last 3 Encounters:   05/29/18 93/61   11/27/17 121/81   08/07/17 116/81     Patient advised to log blood pressures at home weekly and bring to next visit. Call office as soon as possible if BP's over 140/90 on multiple occasions or with symptoms of dizziness, chest pain, shortness of breath, headache or ankle swelling. Our goal is to normalize the blood pressure to decrease the risks of strokes and heart attacks. The patient is in agreement with the plan. Patient Active Problem List   Diagnosis Code    HTN (hypertension) I10    H/O: CVA (cerebrovascular accident) Z80.78    Alcohol abuse F10.10    CHF (congestive heart failure) ([facility]) I50.9    Osteoarthritis, knee M17.10    Knee pain M25.569    Arthritis of knee M17.10    GERD (gastroesophageal reflux disease) K21.9    Neutropenia ([address] D70.9    PE (pulmonary embolism) I26.99    Hyperlipidemia E78.5    Paralysis ([facility]) G83.9    Cough R05     History reviewed. No pertinent surgical history. Social History     Social History    Marital status: LEGALLY      Spouse name: N/A    Number of children: N/A    Years of education: N/A     Occupational History    Not on file.      Social History Main Topics    Smoking status: Former Smoker     Types: Cigars Quit date: 1/1/2007    Smokeless tobacco: Never Used    Alcohol use Yes      Comment: beer occasionally    Drug use: No    Sexual activity: Yes     Other Topics Concern    Not on file     Social History Narrative     Family History   Problem Relation Age of Onset    Hypertension Mother      Current Outpatient Prescriptions   Medication Sig    amLODIPine (NORVASC) 10 mg tablet TAKE ONE TABLET BY MOUTH ONCE DAILY    baclofen (LIORESAL) 10 mg tablet TAKE ONE TABLET BY MOUTH TWICE DAILY    doxazosin (CARDURA) 2 mg tablet TAKE ONE TABLET BY MOUTH ONCE DAILY    famotidine (PEPCID) 20 mg tablet TAKE 1 TABLET BY MOUTH ONCE A DAY AT NIGHT    irbesartan (AVAPRO) 150 mg tablet TAKE ONE TABLET BY MOUTH ONCE DAILY    pravastatin (PRAVACHOL) 40 mg tablet TAKE ONE TABLET BY MOUTH ONCE DAILY    triamterene-hydroCHLOROthiazide (MAXZIDE) 37.5-25 mg per tablet TAKE ONE TABLET BY MOUTH ONCE DAILY    naproxen (NAPROSYN) 500 mg tablet Take 1 Tab by mouth two (2) times daily (with meals). As needed.  hydrOXYzine HCl (ATARAX) 25 mg tablet Take 1 Tab by mouth every eight (8) hours as needed for Itching for up to 10 days.  Cane isabela Adjustable DX Code:Z86.73    Omeprazole delayed release (PRILOSEC D/R) 20 mg tablet Take 1 Tab by mouth daily. No current facility-administered medications for this visit.       Allergies   Allergen Reactions    Ace Inhibitors Other (comments)     Neutropenia      Zestril [Lisinopril] Other (comments)     neutropenia       Review of Systems:  Constitutional: feeling well, denies fatigue, malaise  Skin: see HPi, Negative for rash or lesion  HEENT: Negative for acute hearing or vision changes  Respiratory: Negative for wheezing or SOB  Cardiovascular: Negative for chest pain, dizziness or palpitations  Gastrointestinal: Negative for nausea or abdominal pain  Genital/urinary: Negative for dysuria or voiding dysfunction  Musculoskeletal: Negative for acute myalgia or arthralgia Neurological: see HPI, Negative for HA  Psychlogical: Negative for depression or anxiety     Vitals:    05/29/18 0932   BP: 93/61   Pulse: 94   Resp: 18   Temp: 97.6 °F (36.4 °C)   TempSrc: Oral   SpO2: 96%   Weight: 158 lb 3.2 oz (71.8 kg)   Height: 5' 7\" (1.702 m)       Physical Examination:  General: Well developed, well nourished, in no acute distress  Skin: Warm and dry, no rash or lesion appreciated  Head: Normocephalic, atraumatic  Eyes: Sclera clear, EOMI  Neck: Normal range of motion  Respiratory: Clear to auscultation bilaterally with symmetrical effort  Cardiovascular: Normal S1, S2, Regular rate and rhythm  Extremities: decreased left sided strength, antalgic gait   Neurological: left hemiparesis  Psychological: Active, alert and oriented. Affect appropriate     Diagnoses and all orders for this visit:    1. Essential hypertension with goal blood pressure less than 140/90  -     amLODIPine (NORVASC) 10 mg tablet; TAKE ONE TABLET BY MOUTH ONCE DAILY  -     doxazosin (CARDURA) 2 mg tablet; TAKE ONE TABLET BY MOUTH ONCE DAILY  -     irbesartan (AVAPRO) 150 mg tablet; TAKE ONE TABLET BY MOUTH ONCE DAILY  -     triamterene-hydroCHLOROthiazide (MAXZIDE) 37.5-25 mg per tablet; TAKE ONE TABLET BY MOUTH ONCE DAILY  -     METABOLIC PANEL, COMPREHENSIVE; Future  -     MAGNESIUM; Future  -     TSH 3RD GENERATION; Future    2. Hyperlipidemia, unspecified hyperlipidemia type  -     pravastatin (PRAVACHOL) 40 mg tablet; TAKE ONE TABLET BY MOUTH ONCE DAILY  -     LIPID PANEL; Future  -     METABOLIC PANEL, COMPREHENSIVE; Future    3. Gastroesophageal reflux disease without esophagitis  -     famotidine (PEPCID) 20 mg tablet; TAKE 1 TABLET BY MOUTH ONCE A DAY AT NIGHT    4. H/O: CVA (cerebrovascular accident)  -     baclofen (LIORESAL) 10 mg tablet; TAKE ONE TABLET BY MOUTH TWICE DAILY  -     CBC WITH AUTOMATED DIFF; Future    5.  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 IFG (impaired fasting glucose)  -     CBC WITH AUTOMATED DIFF; Future  -     HEMOGLOBIN A1C WITH EAG; Future    7. Chronic pain of left knee  -     naproxen (NAPROSYN) 500 mg tablet; Take 1 Tab by mouth two (2) times daily (with meals). As needed. 8. Pruritic condition  -     MAGNESIUM; Future  -     CBC WITH AUTOMATED DIFF; Future  -     VITAMIN B12; Future  -     hydrOXYzine HCl (ATARAX) 25 mg tablet; Take 1 Tab by mouth every eight (8) hours as needed for Itching for up to 10 days. Plan of care:  Diagnoses were discussed in detail with patient. Medication risks/benefits/side effects discussed with patient. All of the patient's questions were addressed and answered to apparent satisfaction. The patient understands and agrees with our plan of care. The patient knows to call back if they have questions about the plan of care or if symptoms change. The patient received an After-Visit Summary which contains VS, diagnoses, orders, allergy and medication lists. No future appointments. Follow-up Disposition:  Return in about 6 months (around 11/29/2018), or if symptoms worsen or fail to improve.

## 2018-05-31 LAB
ALBUMIN SERPL-MCNC: 4.4 G/DL (ref 3.5–5.5)
ALBUMIN/GLOB SERPL: 1.6 {RATIO} (ref 1.2–2.2)
ALP SERPL-CCNC: 54 IU/L (ref 39–117)
ALT SERPL-CCNC: 30 IU/L (ref 0–44)
AST SERPL-CCNC: 32 IU/L (ref 0–40)
BASOPHILS # BLD AUTO: 0 X10E3/UL (ref 0–0.2)
BASOPHILS NFR BLD AUTO: 0 %
BILIRUB SERPL-MCNC: 0.5 MG/DL (ref 0–1.2)
BUN SERPL-MCNC: 11 MG/DL (ref 6–24)
BUN/CREAT SERPL: 9 (ref 9–20)
CALCIUM SERPL-MCNC: 9.5 MG/DL (ref 8.7–10.2)
CHLORIDE SERPL-SCNC: 98 MMOL/L (ref 96–106)
CHOLEST SERPL-MCNC: 186 MG/DL (ref 100–199)
CO2 SERPL-SCNC: 26 MMOL/L (ref 18–29)
CREAT SERPL-MCNC: 1.2 MG/DL (ref 0.76–1.27)
EOSINOPHIL # BLD AUTO: 0.2 X10E3/UL (ref 0–0.4)
EOSINOPHIL NFR BLD AUTO: 4 %
ERYTHROCYTE [DISTWIDTH] IN BLOOD BY AUTOMATED COUNT: 13.4 % (ref 12.3–15.4)
EST. AVERAGE GLUCOSE BLD GHB EST-MCNC: 128 MG/DL
GFR SERPLBLD CREATININE-BSD FMLA CKD-EPI: 67 ML/MIN/1.73
GFR SERPLBLD CREATININE-BSD FMLA CKD-EPI: 77 ML/MIN/1.73
GLOBULIN SER CALC-MCNC: 2.8 G/DL (ref 1.5–4.5)
GLUCOSE SERPL-MCNC: 96 MG/DL (ref 65–99)
HBA1C MFR BLD: 6.1 % (ref 4.8–5.6)
HCT VFR BLD AUTO: 42 % (ref 37.5–51)
HDLC SERPL-MCNC: 55 MG/DL
HGB BLD-MCNC: 13.6 G/DL (ref 13–17.7)
IMM GRANULOCYTES # BLD: 0 X10E3/UL (ref 0–0.1)
IMM GRANULOCYTES NFR BLD: 0 %
LDLC SERPL CALC-MCNC: 109 MG/DL (ref 0–99)
LYMPHOCYTES # BLD AUTO: 2.4 X10E3/UL (ref 0.7–3.1)
LYMPHOCYTES NFR BLD AUTO: 59 %
MAGNESIUM SERPL-MCNC: 1.9 MG/DL (ref 1.6–2.3)
MCH RBC QN AUTO: 29.9 PG (ref 26.6–33)
MCHC RBC AUTO-ENTMCNC: 32.4 G/DL (ref 31.5–35.7)
MCV RBC AUTO: 92 FL (ref 79–97)
MONOCYTES # BLD AUTO: 0.3 X10E3/UL (ref 0.1–0.9)
MONOCYTES NFR BLD AUTO: 8 %
NEUTROPHILS # BLD AUTO: 1.2 X10E3/UL (ref 1.4–7)
NEUTROPHILS NFR BLD AUTO: 29 %
PLATELET # BLD AUTO: 249 X10E3/UL (ref 150–379)
POTASSIUM SERPL-SCNC: 3.9 MMOL/L (ref 3.5–5.2)
PROT SERPL-MCNC: 7.2 G/DL (ref 6–8.5)
RBC # BLD AUTO: 4.55 X10E6/UL (ref 4.14–5.8)
SODIUM SERPL-SCNC: 138 MMOL/L (ref 134–144)
TRIGL SERPL-MCNC: 108 MG/DL (ref 0–149)
TSH SERPL DL<=0.005 MIU/L-ACNC: 1.09 UIU/ML (ref 0.45–4.5)
VIT B12 SERPL-MCNC: 439 PG/ML (ref 232–1245)
VLDLC SERPL CALC-MCNC: 22 MG/DL (ref 5–40)
WBC # BLD AUTO: 4 X10E3/UL (ref 3.4–10.8)

## 2018-11-29 ENCOUNTER — OFFICE VISIT (OUTPATIENT)
Dept: FAMILY MEDICINE CLINIC | Age: 58
End: 2018-11-29

## 2018-11-29 VITALS
RESPIRATION RATE: 18 BRPM | HEART RATE: 71 BPM | SYSTOLIC BLOOD PRESSURE: 150 MMHG | TEMPERATURE: 97.6 F | BODY MASS INDEX: 25.24 KG/M2 | OXYGEN SATURATION: 97 % | DIASTOLIC BLOOD PRESSURE: 70 MMHG | WEIGHT: 160.8 LBS | HEIGHT: 67 IN

## 2018-11-29 DIAGNOSIS — I10 ESSENTIAL HYPERTENSION: Primary | ICD-10-CM

## 2018-11-29 DIAGNOSIS — G89.29 CHRONIC PAIN OF LEFT KNEE: ICD-10-CM

## 2018-11-29 DIAGNOSIS — M25.562 CHRONIC PAIN OF LEFT KNEE: ICD-10-CM

## 2018-11-29 DIAGNOSIS — E78.5 HYPERLIPIDEMIA, UNSPECIFIED HYPERLIPIDEMIA TYPE: ICD-10-CM

## 2018-11-29 DIAGNOSIS — R73.02 GLUCOSE INTOLERANCE (IMPAIRED GLUCOSE TOLERANCE): ICD-10-CM

## 2018-11-29 RX ORDER — DICLOFENAC SODIUM 75 MG/1
75 TABLET, DELAYED RELEASE ORAL
Qty: 180 TAB | Refills: 0 | Status: SHIPPED | OUTPATIENT
Start: 2018-11-29 | End: 2019-05-29 | Stop reason: ALTCHOICE

## 2018-11-29 NOTE — PATIENT INSTRUCTIONS
November 29, 2018 Ferny Ng  
[address] [address] Dear Liu Jasmine: 
 
Thank you for requesting access to [employer]. Please follow the instructions below to view your test results, access and download parts of your medical record, view details of your past and upcoming appointments, and view your medications online. How Do I Sign Up? 1. In your internet browser, go to [email or web address] 2. Click on the First Time User? Click Here link in the Sign In box. You will see the New Member Sign Up page. 3. Enter your [employer] Access Code exactly as it appears below. You will not need to use this code after youve completed the sign-up process. If you do not sign up before the expiration date, you must request a new code. · [employer] Access Code: [email or web address] 
· Expires: 2/27/2019  8:54 AM 
 
4. Enter the last four digits of your Social Security Number (xxxx) and Date of Birth (mm/dd/yyyy) as indicated and click Submit. You will be taken to the next sign-up page. 5. Create a [employer] ID. This will be your [employer] login ID and cannot be changed, so think of one that is secure and easy to remember. 6. Create a [employer] password. You can change your password at any time. 7. Enter your Password Reset Question and Answer. This can be used at a later time if you forget your password. 8. Enter your e-mail address. You will receive e-mail notification when new information is available in [address] 9. Click Sign Up. You can now view and download portions of your medical record. 10. Click the Download Summary menu link to download a portable copy of your medical information. Additional Information: If you require any assistance or have any questions, please contact our [address] at [phone], email at [email or web address] or check online in our Frequently Asked Questions. Remember, MyChart is NOT to be used for urgent needs. For medical emergencies, dial 911. Now available from your iPhone and Android! Sincerely, Ed Silence DASH Diet: Care Instructions Your Care Instructions The DASH diet is an eating plan that can help lower your blood pressure. DASH stands for Dietary Approaches to Stop Hypertension. Hypertension is high blood pressure. The DASH diet focuses on eating foods that are high in calcium, potassium, and magnesium. These nutrients can lower blood pressure. The foods that are highest in these nutrients are fruits, vegetables, low-fat dairy products, nuts, seeds, and legumes. But taking calcium, potassium, and magnesium supplements instead of eating foods that are high in those nutrients does not have the same effect. The DASH diet also includes whole grains, fish, and poultry. The DASH diet is one of several lifestyle changes your doctor may recommend to lower your high blood pressure. Your doctor may also want you to decrease the amount of sodium in your diet. Lowering sodium while following the DASH diet can lower blood pressure even further than just the DASH diet alone. Follow-up care is a key part of your treatment and safety. Be sure to make and go to all appointments, and call your doctor if you are having problems. It's also a good idea to know your test results and keep a list of the medicines you take. How can you care for yourself at home? Following the DASH diet · Eat 4 to 5 servings of fruit each day. A serving is 1 medium-sized piece of fruit, ½ cup chopped or canned fruit, 1/4 cup dried fruit, or 4 ounces (½ cup) of fruit juice. Choose fruit more often than fruit juice. · Eat 4 to 5 servings of vegetables each day. A serving is 1 cup of lettuce or raw leafy vegetables, ½ cup of chopped or cooked vegetables, or 4 ounces (½ cup) of vegetable juice. Choose vegetables more often than vegetable juice. · Get 2 to 3 servings of low-fat and fat-free dairy each day. A serving is 8 ounces of milk, 1 cup of yogurt, or 1 ½ ounces of cheese. · Eat 6 to 8 servings of grains each day. A serving is 1 slice of bread, 1 ounce of dry cereal, or ½ cup of cooked rice, pasta, or cooked cereal. Try to choose whole-grain products as much as possible. · Limit lean meat, poultry, and fish to 2 servings each day. A serving is 3 ounces, about the size of a deck of cards. · Eat 4 to 5 servings of nuts, seeds, and legumes (cooked dried beans, lentils, and split peas) each week. A serving is 1/3 cup of nuts, 2 tablespoons of seeds, or ½ cup of cooked beans or peas. · Limit fats and oils to 2 to 3 servings each day. A serving is 1 teaspoon of vegetable oil or 2 tablespoons of salad dressing. · Limit sweets and added sugars to 5 servings or less a week. A serving is 1 tablespoon jelly or jam, ½ cup sorbet, or 1 cup of lemonade. · Eat less than 2,300 milligrams (mg) of sodium a day. If you limit your sodium to 1,500 mg a day, you can lower your blood pressure even more. Tips for success · Start small. Do not try to make dramatic changes to your diet all at once. You might feel that you are missing out on your favorite foods and then be more likely to not follow the plan. Make small changes, and stick with them. Once those changes become habit, add a few more changes. · Try some of the following: ? Make it a goal to eat a fruit or vegetable at every meal and at snacks. This will make it easy to get the recommended amount of fruits and vegetables each day. ? Try yogurt topped with fruit and nuts for a snack or healthy dessert. ? Add lettuce, tomato, cucumber, and onion to sandwiches. ? Combine a ready-made pizza crust with low-fat mozzarella cheese and lots of vegetable toppings. Try using tomatoes, squash, spinach, broccoli, carrots, cauliflower, and onions. ? Have a variety of cut-up vegetables with a low-fat dip as an appetizer instead of chips and dip. ? Sprinkle sunflower seeds or chopped almonds over salads. Or try adding chopped walnuts or almonds to cooked vegetables. ? Try some vegetarian meals using beans and peas. Add garbanzo or kidney beans to salads. Make burritos and tacos with mashed benoit beans or black beans. Where can you learn more? Go to http://maico-leonora.info/. Enter Z336 in the search box to learn more about \"DASH Diet: Care Instructions. \" Current as of: December 6, 2017 Content Version: 11.8 © 7271-7069 [employer]. Care instructions adapted under license by [employer] (which disclaims liability or warranty for this information). If you have questions about a medical condition or this instruction, always ask your healthcare professional. [address] [address] any warranty or liability for your use of this information.

## 2018-11-29 NOTE — PROGRESS NOTES
Visit Vitals /70 (BP 1 Location: Right arm, BP Patient Position: Sitting) Pulse 71 Temp 97.6 °F (36.4 °C) (Oral) Resp 18 Ht 5' 7\" (1.702 m) Wt 160 lb 12.8 oz (72.9 kg) SpO2 97% BMI 25.18 kg/m² 1. Have you been to the ER, urgent care clinic since your last visit? Hospitalized since your last visit? No 
 
2. Have you seen or consulted any other health care providers outside of the [address] [place] [address] since your last visit? Include any pap smears or colon screening. No 
 
Reviewed record in preparation for visit and have necessary documentation Pt did not bring medication to office visit for review 
opportunity was given for questions Goals that were addressed and/or need to be completed during or after this appointment include Health Maintenance Due Topic Date Due  Pneumococcal 19-64 Medium Risk (1 of 1 - PPSV23) 12/27/1979  
 DTaP/Tdap/Td series (1 - Tdap) 12/27/1981  Shingrix Vaccine Age 50> (1 of 2) 12/27/2010  Influenza Age 5 to Adult  08/01/2018

## 2018-11-30 LAB
ALBUMIN SERPL-MCNC: 4.6 G/DL (ref 3.5–5.5)
ALBUMIN/GLOB SERPL: 1.4 {RATIO} (ref 1.2–2.2)
ALP SERPL-CCNC: 58 IU/L (ref 39–117)
ALT SERPL-CCNC: 19 IU/L (ref 0–44)
AST SERPL-CCNC: 21 IU/L (ref 0–40)
BILIRUB SERPL-MCNC: 0.4 MG/DL (ref 0–1.2)
BUN SERPL-MCNC: 20 MG/DL (ref 6–24)
BUN/CREAT SERPL: 14 (ref 9–20)
CALCIUM SERPL-MCNC: 9.6 MG/DL (ref 8.7–10.2)
CHLORIDE SERPL-SCNC: 96 MMOL/L (ref 96–106)
CHOLEST SERPL-MCNC: 228 MG/DL (ref 100–199)
CO2 SERPL-SCNC: 25 MMOL/L (ref 20–29)
CREAT SERPL-MCNC: 1.42 MG/DL (ref 0.76–1.27)
EST. AVERAGE GLUCOSE BLD GHB EST-MCNC: 131 MG/DL
GLOBULIN SER CALC-MCNC: 3.3 G/DL (ref 1.5–4.5)
GLUCOSE SERPL-MCNC: 87 MG/DL (ref 65–99)
HBA1C MFR BLD: 6.2 % (ref 4.8–5.6)
HDLC SERPL-MCNC: 55 MG/DL
LDLC SERPL CALC-MCNC: 154 MG/DL (ref 0–99)
POTASSIUM SERPL-SCNC: 3.9 MMOL/L (ref 3.5–5.2)
PROT SERPL-MCNC: 7.9 G/DL (ref 6–8.5)
SODIUM SERPL-SCNC: 137 MMOL/L (ref 134–144)
TRIGL SERPL-MCNC: 96 MG/DL (ref 0–149)
TSH SERPL DL<=0.005 MIU/L-ACNC: 1.26 UIU/ML (ref 0.45–4.5)
VLDLC SERPL CALC-MCNC: 19 MG/DL (ref 5–40)

## 2018-11-30 NOTE — PROGRESS NOTES
Chief Complaint Patient presents with  Hypertension  
 
he is a 62y.o. year old male who presents for follow up chronic medical conditions. Patient with hx of CVA with left sided hemiparesis, HTN, HLD and OA. Patient denies HA, dizziness, SOB, CP, abdominal pain, dysuria, acute myalgias or arthralgias. He complains of continued left knee pain. He is due for lab work. He does need medication refills. Hypertension: The patient reports:  taking medications as instructed, no medication side effects noted, no TIA's, no chest pain on exertion, no dyspnea on exertion, no swelling of ankles. Lifestyle modification/social history: sedentary BP Readings from Last 3 Encounters:  
11/29/18 150/70  
05/29/18 93/61  
11/27/17 121/81 Lab Results Component Value Date/Time Sodium 137 11/29/2018 12:37 PM  
 Potassium 3.9 11/29/2018 12:37 PM  
 Chloride 96 11/29/2018 12:37 PM  
 CO2 25 11/29/2018 12:37 PM  
 Glucose 87 11/29/2018 12:37 PM  
 BUN 20 11/29/2018 12:37 PM  
 Creatinine 1.42 (H) 11/29/2018 12:37 PM  
 BUN/Creatinine ratio 14 11/29/2018 12:37 PM  
 GFR est AA 63 11/29/2018 12:37 PM  
 GFR est non-AA 54 (L) 11/29/2018 12:37 PM  
 Calcium 9.6 11/29/2018 12:37 PM  
 Bilirubin, total 0.4 11/29/2018 12:37 PM  
 ALT (SGPT) 19 11/29/2018 12:37 PM  
 AST (SGOT) 21 11/29/2018 12:37 PM  
 Alk. phosphatase 58 11/29/2018 12:37 PM  
 Protein, total 7.9 11/29/2018 12:37 PM  
 Albumin 4.6 11/29/2018 12:37 PM  
 A-G Ratio 1.4 11/29/2018 12:37 PM  
  
 
 
Patient advised to log blood pressures at home weekly and bring to next visit. Call office as soon as possible if BP's over 140/90 on multiple occasions or with symptoms of dizziness, chest pain, shortness of breath, headache or ankle swelling. Our goal is to normalize the blood pressure to decrease the risks of strokes and heart attacks. The patient is in agreement with the plan.  
 
 
Patient Active Problem List  
Diagnosis Code  
 HTN (hypertension) I10  
  H/O: CVA (cerebrovascular accident) Z80.78  
 Alcohol abuse F10.10  CHF (congestive heart failure) ([facility]) I50.9  Osteoarthritis, knee M17.10  Knee pain M25.569  Arthritis of knee M17.10  GERD (gastroesophageal reflux disease) K21.9  Neutropenia ([facility] [address] D70.9  PE (pulmonary embolism) I26.99  
 Hyperlipidemia E78.5  Paralysis ([facility] [address] G83.9  Cough R05 History reviewed. No pertinent surgical history. Social History Socioeconomic History  Marital status: LEGALLY  Spouse name: Not on file  Number of children: Not on file  Years of education: Not on file  Highest education level: Not on file Social Needs  Financial resource strain: Not on file  Food insecurity - worry: Not on file  Food insecurity - inability: Not on file  Transportation needs - medical: Not on file  Transportation needs - non-medical: Not on file Occupational History  Not on file Tobacco Use  Smoking status: Former Smoker Types: Cigars Last attempt to quit: 2007 Years since quittin.9  Smokeless tobacco: Never Used Substance and Sexual Activity  Alcohol use: Yes Comment: beer occasionally  Drug use: No  
 Sexual activity: Yes Other Topics Concern  Not on file Social History Narrative  Not on file Family History Problem Relation Age of Onset  Hypertension Mother Current Outpatient Medications Medication Sig  
 diclofenac EC (VOLTAREN) 75 mg EC tablet Take 1 Tab by mouth two (2) times daily as needed.  hydrOXYzine HCl (ATARAX) 25 mg tablet TAKE 1 TABLET BY MOUTH EVERY 8 HOURS AS NEEDED FOR ITCHING FOR  UP  TO  10  DAYS  amLODIPine (NORVASC) 10 mg tablet TAKE ONE TABLET BY MOUTH ONCE DAILY  baclofen (LIORESAL) 10 mg tablet TAKE ONE TABLET BY MOUTH TWICE DAILY  doxazosin (CARDURA) 2 mg tablet TAKE ONE TABLET BY MOUTH ONCE DAILY  famotidine (PEPCID) 20 mg tablet TAKE 1 TABLET BY MOUTH ONCE A DAY AT NIGHT  pravastatin (PRAVACHOL) 40 mg tablet TAKE ONE TABLET BY MOUTH ONCE DAILY  triamterene-hydroCHLOROthiazide (MAXZIDE) 37.5-25 mg per tablet TAKE ONE TABLET BY MOUTH ONCE DAILY  Omeprazole delayed release (PRILOSEC D/R) 20 mg tablet Take 1 Tab by mouth daily.  irbesartan (AVAPRO) 150 mg tablet TAKE 1 TABLET BY MOUTH ONCE DAILY  Cane isabela Adjustable DX ASFQ:Q09.60 No current facility-administered medications for this visit. Allergies Allergen Reactions  Ace Inhibitors Other (comments) Neutropenia  Zestril [Lisinopril] Other (comments)  
  neutropenia Review of Systems: 
Constitutional: feeling well, denies fatigue, malaise Skin: see HPi, Negative for rash or lesion HEENT: Negative for acute hearing or vision changes Respiratory: Negative for wheezing or SOB Cardiovascular: Negative for chest pain, dizziness or palpitations Gastrointestinal: Negative for nausea or abdominal pain Genital/urinary: Negative for dysuria or voiding dysfunction Musculoskeletal: Negative for acute myalgia or arthralgia Neurological: see HPI, Negative for HA Psychlogical: Negative for depression or anxiety Vitals:  
 11/29/18 2052 BP: 150/70 Pulse: 71 Resp: 18 Temp: 97.6 °F (36.4 °C) TempSrc: Oral  
SpO2: 97% Weight: 160 lb 12.8 oz (72.9 kg) Height: 5' 7\" (1.702 m) Physical Examination: 
General: Well developed, well nourished, in no acute distress Skin: Warm and dry, no rash or lesion appreciated Head: Normocephalic, atraumatic Eyes: Sclera clear, EOMI Neck: Normal range of motion Respiratory: Clear to auscultation bilaterally with symmetrical effort Cardiovascular: Normal S1, S2, Regular rate and rhythm Extremities: decreased left sided strength, antalgic gait Neurological: left hemiparesis Psychological: Active, alert and oriented. Affect appropriate Diagnoses and all orders for this visit: 1.  Essential hypertension 
-     TSH 3RD GENERATION 
 -     METABOLIC PANEL, COMPREHENSIVE 2. Glucose intolerance (impaired glucose tolerance) 
-     HEMOGLOBIN A1C WITH EAG 
-     METABOLIC PANEL, COMPREHENSIVE 3. Hyperlipidemia, unspecified hyperlipidemia type -     LIPID PANEL 
-     METABOLIC PANEL, COMPREHENSIVE 4. Chronic pain of left knee 
-     diclofenac EC (VOLTAREN) 75 mg EC tablet; Take 1 Tab by mouth two (2) times daily as needed. -     XR KNEE LT MAX 2 VWS; Future Plan of care: 
Diagnoses were discussed in detail with patient. Medication risks/benefits/side effects discussed with patient. All of the patient's questions were addressed and answered to apparent satisfaction. The patient understands and agrees with our plan of care. The patient knows to call back if they have questions about the plan of care or if symptoms change. The patient received an After-Visit Summary which contains VS, diagnoses, orders, allergy and medication lists. Future Appointments Date Time Provider Rebecca Patterson 5/29/2019  8:20 AM Kath Morrison MD [facility] Follow-up Disposition: 
Return in about 6 months (around 5/29/2019), or if symptoms worsen or fail to improve.

## 2018-12-26 DIAGNOSIS — E78.5 HYPERLIPIDEMIA, UNSPECIFIED HYPERLIPIDEMIA TYPE: ICD-10-CM

## 2018-12-26 RX ORDER — PRAVASTATIN SODIUM 40 MG/1
TABLET ORAL
Qty: 90 TAB | Refills: 1 | Status: SHIPPED | OUTPATIENT
Start: 2018-12-26 | End: 2020-10-12 | Stop reason: SDUPTHER

## 2019-03-08 ENCOUNTER — TELEPHONE (OUTPATIENT)
Dept: FAMILY MEDICINE CLINIC | Age: 59
End: 2019-03-08

## 2019-03-10 RX ORDER — VALSARTAN 160 MG/1
160 TABLET ORAL DAILY
Qty: 30 TAB | Refills: 3 | Status: SHIPPED | OUTPATIENT
Start: 2019-03-10 | End: 2019-07-25 | Stop reason: SDUPTHER

## 2019-04-04 DIAGNOSIS — M25.562 CHRONIC PAIN OF LEFT KNEE: ICD-10-CM

## 2019-04-04 DIAGNOSIS — G89.29 CHRONIC PAIN OF LEFT KNEE: ICD-10-CM

## 2019-04-04 RX ORDER — NAPROXEN 500 MG/1
TABLET ORAL
Qty: 180 TAB | Refills: 0 | OUTPATIENT
Start: 2019-04-04

## 2019-05-29 ENCOUNTER — OFFICE VISIT (OUTPATIENT)
Dept: FAMILY MEDICINE CLINIC | Age: 59
End: 2019-05-29

## 2019-05-29 VITALS
BODY MASS INDEX: 25.58 KG/M2 | DIASTOLIC BLOOD PRESSURE: 88 MMHG | HEIGHT: 67 IN | OXYGEN SATURATION: 96 % | SYSTOLIC BLOOD PRESSURE: 130 MMHG | TEMPERATURE: 98.3 F | HEART RATE: 64 BPM | RESPIRATION RATE: 18 BRPM | WEIGHT: 163 LBS

## 2019-05-29 DIAGNOSIS — I69.954 HEMIPARESIS OF LEFT NONDOMINANT SIDE AS LATE EFFECT OF CEREBROVASCULAR DISEASE, UNSPECIFIED CEREBROVASCULAR DISEASE TYPE (HCC): ICD-10-CM

## 2019-05-29 DIAGNOSIS — R73.01 IFG (IMPAIRED FASTING GLUCOSE): ICD-10-CM

## 2019-05-29 DIAGNOSIS — R05.9 COUGH: ICD-10-CM

## 2019-05-29 DIAGNOSIS — G89.29 CHRONIC PAIN OF LEFT KNEE: ICD-10-CM

## 2019-05-29 DIAGNOSIS — E78.5 HYPERLIPIDEMIA, UNSPECIFIED HYPERLIPIDEMIA TYPE: ICD-10-CM

## 2019-05-29 DIAGNOSIS — M25.562 CHRONIC PAIN OF LEFT KNEE: ICD-10-CM

## 2019-05-29 DIAGNOSIS — I10 ESSENTIAL HYPERTENSION: Primary | ICD-10-CM

## 2019-05-29 RX ORDER — PROMETHAZINE HYDROCHLORIDE AND CODEINE PHOSPHATE 6.25; 1 MG/5ML; MG/5ML
5 SOLUTION ORAL
Qty: 240 ML | Refills: 0 | Status: SHIPPED | OUTPATIENT
Start: 2019-05-29 | End: 2019-06-05

## 2019-05-29 RX ORDER — MELOXICAM 15 MG/1
15 TABLET ORAL DAILY
Qty: 90 TAB | Refills: 0 | Status: SHIPPED | OUTPATIENT
Start: 2019-05-29 | End: 2019-09-23 | Stop reason: SDUPTHER

## 2019-05-29 NOTE — PROGRESS NOTES
Dr. Juan Diego Arguelles - Ortho  Chief Complaint   Patient presents with    Hypertension     he is a 62y.o. year old male who presents for follow up chronic medical conditions. Patient with hx of CVA with left sided hemiparesis, HTN, HLD and OA. Patient complains of congestion and persistent dry cough. He cites worsening left knee pain. Patient denies F/C, HA, dizziness, SOB, CP, abdominal pain, dysuria, acute myalgias or arthralgias. He is due for lab work. He does need medication refills. Hypertension:  The patient reports:  taking medications as instructed, no medication side effects noted, no TIA's, no chest pain on exertion, no dyspnea on exertion, no swelling of ankles. Lifestyle modification/social history: sedentary     BP Readings from Last 3 Encounters:   05/29/19 130/88   11/29/18 150/70   05/29/18 93/61     Lab Results   Component Value Date/Time    Sodium 139 05/29/2019 09:38 AM    Potassium 4.0 05/29/2019 09:38 AM    Chloride 99 05/29/2019 09:38 AM    CO2 24 05/29/2019 09:38 AM    Glucose 105 (H) 05/29/2019 09:38 AM    BUN 16 05/29/2019 09:38 AM    Creatinine 1.25 05/29/2019 09:38 AM    BUN/Creatinine ratio 13 05/29/2019 09:38 AM    GFR est AA 73 05/29/2019 09:38 AM    GFR est non-AA 63 05/29/2019 09:38 AM    Calcium 9.3 05/29/2019 09:38 AM    Bilirubin, total 0.4 05/29/2019 09:38 AM    ALT (SGPT) 21 05/29/2019 09:38 AM    AST (SGOT) 22 05/29/2019 09:38 AM    Alk. phosphatase 66 05/29/2019 09:38 AM    Protein, total 7.6 05/29/2019 09:38 AM    Albumin 4.4 05/29/2019 09:38 AM    A-G Ratio 1.4 05/29/2019 09:38 AM          Patient advised to log blood pressures at home weekly and bring to next visit. Call office as soon as possible if BP's over 140/90 on multiple occasions or with symptoms of dizziness, chest pain, shortness of breath, headache or ankle swelling. Our goal is to normalize the blood pressure to decrease the risks of strokes and heart attacks.  The patient is in agreement with the plan.      Patient Active Problem List   Diagnosis Code    HTN (hypertension) I10    H/O: CVA (cerebrovascular accident) Z80.78    Alcohol abuse F10.10    CHF (congestive heart failure) ([facility]) I50.9    Osteoarthritis, knee M17.10    Knee pain M25.569    Arthritis of knee M17.10    GERD (gastroesophageal reflux disease) K21.9    Neutropenia ([facility]) D70.9    PE (pulmonary embolism) I26.99    Hyperlipidemia E78.5    Paralysis ([facility]) G83.9    Cough R05     No past surgical history on file.   Social History     Socioeconomic History    Marital status: LEGALLY      Spouse name: Not on file    Number of children: Not on file    Years of education: Not on file    Highest education level: Not on file   Occupational History    Not on file   Social Needs    Financial resource strain: Not on file    Food insecurity:     Worry: Not on file     Inability: Not on file    Transportation needs:     Medical: Not on file     Non-medical: Not on file   Tobacco Use    Smoking status: Former Smoker     Types: Cigars     Last attempt to quit: 2007     Years since quittin.4    Smokeless tobacco: Never Used   Substance and Sexual Activity    Alcohol use: Yes     Comment: beer occasionally    Drug use: No    Sexual activity: Yes   Lifestyle    Physical activity:     Days per week: Not on file     Minutes per session: Not on file    Stress: Not on file   Relationships    Social connections:     Talks on phone: Not on file     Gets together: Not on file     Attends [employer] service: Not on file     Active member of club or organization: Not on file     Attends meetings of clubs or organizations: Not on file     Relationship status: Not on file    Intimate partner violence:     Fear of current or ex partner: Not on file     Emotionally abused: Not on file     Physically abused: Not on file     Forced sexual activity: Not on file   Other Topics Concern    Not on file   Social History Narrative    Not on file     Family History   Problem Relation Age of Onset    Hypertension Mother      Current Outpatient Medications   Medication Sig    promethazine-codeine (PHENERGAN WITH CODEINE) 6.25-10 mg/5 mL syrup Take 5 mL by mouth every six (6) hours as needed for Cough for up to 7 days. Max Daily Amount: 20 mL.  meloxicam (MOBIC) 15 mg tablet Take 1 Tab by mouth daily.  baclofen (LIORESAL) 10 mg tablet TAKE 1 TABLET BY MOUTH TWICE DAILY    hydrOXYzine HCl (ATARAX) 25 mg tablet TAKE 1 TABLET BY MOUTH EVERY 8 HOURS AS NEEDED FOR  ITCHING    valsartan (DIOVAN) 160 mg tablet Take 1 Tab by mouth daily.  doxazosin (CARDURA) 2 mg tablet TAKE 1 TABLET BY MOUTH ONCE DAILY    famotidine (PEPCID) 20 mg tablet TAKE 1 TABLET BY MOUTH ONCE DAILY AT  NIGHT    pravastatin (PRAVACHOL) 40 mg tablet TAKE ONE TABLET BY MOUTH ONCE DAILY    triamterene-hydroCHLOROthiazide (MAXZIDE) 37.5-25 mg per tablet TAKE 1 TABLET BY MOUTH ONCE DAILY    amLODIPine (NORVASC) 10 mg tablet TAKE ONE TABLET BY MOUTH ONCE DAILY    Cane isabela Adjustable DX Code:Z86.73    Omeprazole delayed release (PRILOSEC D/R) 20 mg tablet Take 1 Tab by mouth daily. No current facility-administered medications for this visit.       Allergies   Allergen Reactions    Ace Inhibitors Other (comments)     Neutropenia      Zestril [Lisinopril] Other (comments)     neutropenia       Review of Systems:  Constitutional: feeling well, denies fatigue, malaise  Skin: see HPi, Negative for rash or lesion  HEENT: Negative for acute hearing or vision changes  Respiratory: Negative for wheezing or SOB  Cardiovascular: Negative for chest pain, dizziness or palpitations  Gastrointestinal: Negative for nausea or abdominal pain  Genital/urinary: Negative for dysuria or voiding dysfunction  Musculoskeletal: Negative for acute myalgia or arthralgia   Neurological: see HPI, Negative for HA  Psychlogical: Negative for depression or anxiety     Vitals:    05/29/19 0840   BP: 130/88   Pulse: 64   Resp: 18   Temp: 98.3 °F (36.8 °C)   TempSrc: Oral   SpO2: 96%   Weight: 163 lb (73.9 kg)   Height: 5' 7\" (1.702 m)       Physical Examination:  General: Well developed, well nourished, in no acute distress  Skin: Warm and dry, no rash or lesion appreciated  Head: Normocephalic, atraumatic  Eyes: Sclera clear, EOMI  Neck: Normal range of motion  Respiratory: Clear to auscultation bilaterally with symmetrical effort  Cardiovascular: Normal S1, S2, Regular rate and rhythm  Extremities: decreased left sided strength, antalgic gait   Neurological: left hemiparesis  Psychological: Active, alert and oriented. Affect appropriate     Diagnoses and all orders for this visit:    1. Essential hypertension  -     METABOLIC PANEL, COMPREHENSIVE  -     TSH 3RD GENERATION  -     HGB & HCT    2. Hyperlipidemia, unspecified hyperlipidemia type  -     LIPID PANEL  -     METABOLIC PANEL, COMPREHENSIVE  -     promethazine-codeine (PHENERGAN WITH CODEINE) 6.25-10 mg/5 mL syrup; Take 5 mL by mouth every six (6) hours as needed for Cough for up to 7 days. Max Daily Amount: 20 mL. 3. IFG (impaired fasting glucose)  -     METABOLIC PANEL, COMPREHENSIVE  -     HEMOGLOBIN A1C WITH EAG  -     HGB & HCT    4. Hemiparesis of left nondominant side as late effect of cerebrovascular disease, unspecified cerebrovascular disease type ([facility] [address]    5. Chronic pain of left knee  -     meloxicam (MOBIC) 15 mg tablet; Take 1 Tab by mouth daily. 6. Cough  -     promethazine-codeine (PHENERGAN WITH CODEINE) 6.25-10 mg/5 mL syrup; Take 5 mL by mouth every six (6) hours as needed for Cough for up to 7 days. Max Daily Amount: 20 mL. Plan of care:  Diagnoses were discussed in detail with patient. Medication risks/benefits/side effects discussed with patient. All of the patient's questions were addressed and answered to apparent satisfaction. The patient understands and agrees with our plan of care.   The patient knows to call back if they have questions about the plan of care or if symptoms change. The patient received an After-Visit Summary which contains VS, diagnoses, orders, allergy and medication lists. Over half of the 40 minutes face to face with Aristeo Ng consisted of counseling and discussing treatment plans in reference to his knee pain, chronic cough and chronic co-morbidities.      Future Appointments   Date Time Provider Rebecca Patterson   12/4/2019  8:00 AM Mireya Brown MD [facility]

## 2019-05-29 NOTE — PROGRESS NOTES
1. Have you been to the ER, urgent care clinic since your last visit? Hospitalized since your last visit? No    2. Have you seen or consulted any other health care providers outside of the [address] [place] [address] since your last visit? Include any pap smears or colon screening.  No  Reviewed record in preparation for visit and have necessary documentation  Pt did not bring medication to office visit for review  opportunity was given for questions  Goals that were addressed and/or need to be completed during or after this appointment include    Health Maintenance Due   Topic Date Due    Pneumococcal 0-64 years (1 of 1 - PPSV23) 12/27/1966    DTaP/Tdap/Td series (1 - Tdap) 12/27/1981    Shingrix Vaccine Age 50> (1 of 2) 12/27/2010

## 2019-05-30 LAB
ALBUMIN SERPL-MCNC: 4.4 G/DL (ref 3.5–5.5)
ALBUMIN/GLOB SERPL: 1.4 {RATIO} (ref 1.2–2.2)
ALP SERPL-CCNC: 66 IU/L (ref 39–117)
ALT SERPL-CCNC: 21 IU/L (ref 0–44)
AST SERPL-CCNC: 22 IU/L (ref 0–40)
BILIRUB SERPL-MCNC: 0.4 MG/DL (ref 0–1.2)
BUN SERPL-MCNC: 16 MG/DL (ref 6–24)
BUN/CREAT SERPL: 13 (ref 9–20)
CALCIUM SERPL-MCNC: 9.3 MG/DL (ref 8.7–10.2)
CHLORIDE SERPL-SCNC: 99 MMOL/L (ref 96–106)
CHOLEST SERPL-MCNC: 161 MG/DL (ref 100–199)
CO2 SERPL-SCNC: 24 MMOL/L (ref 20–29)
CREAT SERPL-MCNC: 1.25 MG/DL (ref 0.76–1.27)
EST. AVERAGE GLUCOSE BLD GHB EST-MCNC: 128 MG/DL
GLOBULIN SER CALC-MCNC: 3.2 G/DL (ref 1.5–4.5)
GLUCOSE SERPL-MCNC: 105 MG/DL (ref 65–99)
HBA1C MFR BLD: 6.1 % (ref 4.8–5.6)
HCT VFR BLD AUTO: 44.3 % (ref 37.5–51)
HDLC SERPL-MCNC: 56 MG/DL
HGB BLD-MCNC: 14.1 G/DL (ref 13–17.7)
LDLC SERPL CALC-MCNC: 91 MG/DL (ref 0–99)
POTASSIUM SERPL-SCNC: 4 MMOL/L (ref 3.5–5.2)
PROT SERPL-MCNC: 7.6 G/DL (ref 6–8.5)
SODIUM SERPL-SCNC: 139 MMOL/L (ref 134–144)
TRIGL SERPL-MCNC: 72 MG/DL (ref 0–149)
TSH SERPL DL<=0.005 MIU/L-ACNC: 1 UIU/ML (ref 0.45–4.5)
VLDLC SERPL CALC-MCNC: 14 MG/DL (ref 5–40)

## 2019-05-31 NOTE — PATIENT INSTRUCTIONS
Joint Pain: Care Instructions  Your Care Instructions    Many people have small aches and pains from overuse or injury to muscles and joints. Joint injuries often happen during sports or recreation, work tasks, or projects around the home. An overuse injury can happen when you put too much stress on a joint or when you do an activity that stresses the joint over and over, such as using the computer or rowing a boat. You can take action at home to help your muscles and joints get better. You should feel better in 1 to 2 weeks, but it can take 3 months or more to heal completely. Follow-up care is a key part of your treatment and safety. Be sure to make and go to all appointments, and call your doctor if you are having problems. It's also a good idea to know your test results and keep a list of the medicines you take. How can you care for yourself at home? · Do not put weight on the injured joint for at least a day or two. · For the first day or two after an injury, do not take hot showers or baths, and do not use hot packs. The heat could make swelling worse. · Put ice or a cold pack on the sore joint for 10 to 20 minutes at a time. Try to do this every 1 to 2 hours for the next 3 days (when you are awake) or until the swelling goes down. Put a thin cloth between the ice and your skin. · Wrap the injury in an elastic bandage. Do not wrap it too tightly because this can cause more swelling. · Prop up the sore joint on a pillow when you ice it or anytime you sit or lie down during the next 3 days. Try to keep it above the level of your heart. This will help reduce swelling. · Take an over-the-counter pain medicine, such as acetaminophen (Tylenol), ibuprofen (Advil, Motrin), or naproxen (Aleve). Read and follow all instructions on the label. · After 1 or 2 days of rest, begin moving the joint gently.  While the joint is still healing, you can begin to exercise using activities that do not strain or hurt the painful joint. When should you call for help? Call your doctor now or seek immediate medical care if:    · You have signs of infection, such as:  ? Increased pain, swelling, warmth, and redness. ? Red streaks leading from the joint. ? A fever.    Watch closely for changes in your health, and be sure to contact your doctor if:    · Your movement or symptoms are not getting better after 1 to 2 weeks of home treatment. Where can you learn more? Go to http://maico-leonora.info/. Enter P205 in the search box to learn more about \"Joint Pain: Care Instructions. \"  Current as of: September 20, 2018  Content Version: 11.9  © 6050-6827 [employer]. Care instructions adapted under license by [employer] your brands (which disclaims liability or warranty for this information). If you have questions about a medical condition or this instruction, always ask your healthcare professional. [address] any warranty or liability for your use of this information.

## 2020-01-15 ENCOUNTER — OFFICE VISIT (OUTPATIENT)
Dept: FAMILY MEDICINE CLINIC | Age: 60
End: 2020-01-15

## 2020-01-15 ENCOUNTER — HOSPITAL ENCOUNTER (OUTPATIENT)
Dept: LAB | Age: 60
Discharge: HOME | End: 2020-01-15

## 2020-01-15 VITALS
BODY MASS INDEX: 26.21 KG/M2 | OXYGEN SATURATION: 97 % | HEART RATE: 55 BPM | HEIGHT: 67 IN | TEMPERATURE: 97.7 F | RESPIRATION RATE: 16 BRPM | DIASTOLIC BLOOD PRESSURE: 71 MMHG | SYSTOLIC BLOOD PRESSURE: 114 MMHG | WEIGHT: 167 LBS

## 2020-01-15 DIAGNOSIS — R73.01 IFG (IMPAIRED FASTING GLUCOSE): ICD-10-CM

## 2020-01-15 DIAGNOSIS — I10 ESSENTIAL HYPERTENSION: Primary | ICD-10-CM

## 2020-01-15 DIAGNOSIS — I69.954 HEMIPARESIS OF LEFT NONDOMINANT SIDE AS LATE EFFECT OF CEREBROVASCULAR DISEASE, UNSPECIFIED CEREBROVASCULAR DISEASE TYPE (HCC): ICD-10-CM

## 2020-01-15 DIAGNOSIS — E78.5 HYPERLIPIDEMIA, UNSPECIFIED HYPERLIPIDEMIA TYPE: ICD-10-CM

## 2020-01-15 DIAGNOSIS — Z86.73 H/O: CVA (CEREBROVASCULAR ACCIDENT): ICD-10-CM

## 2020-01-15 DIAGNOSIS — I10 ESSENTIAL HYPERTENSION: ICD-10-CM

## 2020-01-15 NOTE — PATIENT INSTRUCTIONS
January 15, 2020 Ferny Ng  
[address] [place] [address] Dear Luz Oseguera: 
 
Thank you for requesting access to [facility]. Please follow the instructions below to view your test results, access and download parts of your medical record, view details of your past and upcoming appointments, and view your medications online. How Do I Sign Up? 1. In your internet browser, go to [email or web address] 2. Click on the First Time User? Click Here link in the Sign In box. You will see the New Member Sign Up page. 3. Enter your [facility] Access Code exactly as it appears below. You will not need to use this code after youve completed the sign-up process. If you do not sign up before the expiration date, you must request a new code. · [facility] Access Code: [email or web address] · Expires: 2/29/2020  9:32 AM 
 
4. Enter the last four digits of your Social Security Number (xxxx) and Date of Birth (mm/dd/yyyy) as indicated and click Submit. You will be taken to the next sign-up page. 5. Create a [facility] ID. This will be your [facility] login ID and cannot be changed, so think of one that is secure and easy to remember. 6. Create a [facility] password. You can change your password at any time. 7. Enter your Password Reset Question and Answer. This can be used at a later time if you forget your password. 8. Enter your e-mail address. You will receive e-mail notification when new information is available in [address]. 9. Click Sign Up. You can now view and download portions of your medical record. 10. Click the Download Summary menu link to download a portable copy of your medical information. Additional Information: If you require any assistance or have any questions, please contact our [address] at [phone], email at [email or web address] or check online in our Frequently Asked Questions. Remember, MyChart is NOT to be used for urgent needs. For medical emergencies, dial 911. Now available from your iPhone and Android! Sincerely, Jose A Temple

## 2020-01-15 NOTE — PROGRESS NOTES
1. Have you been to the ER, urgent care clinic since your last visit? Hospitalized since your last visit? No    2. Have you seen or consulted any other health care providers outside of the [address] [place] [address] since your last visit? Include any pap smears or colon screening.  No  Reviewed record in preparation for visit and have necessary documentation  Pt did not bring medication to office visit for review    Goals that were addressed and/or need to be completed during or after this appointment include     Health Maintenance Due   Topic Date Due    Pneumococcal 0-64 years (1 of 1 - PPSV23) 12/27/1966    DTaP/Tdap/Td series (1 - Tdap) 12/27/1971    Shingrix Vaccine Age 50> (1 of 2) 12/27/2010    Influenza Age 9 to Adult  08/01/2019

## 2020-01-18 LAB
ALBUMIN SERPL-MCNC: 4.5 G/DL (ref 3.5–5.5)
ALBUMIN/GLOB SERPL: 1.5 {RATIO} (ref 1.2–2.2)
ALP SERPL-CCNC: 61 IU/L (ref 39–117)
ALT SERPL-CCNC: 32 IU/L (ref 0–44)
AST SERPL-CCNC: 26 IU/L (ref 0–40)
BILIRUB SERPL-MCNC: 0.5 MG/DL (ref 0–1.2)
BUN SERPL-MCNC: 17 MG/DL (ref 6–24)
BUN/CREAT SERPL: 12 (ref 9–20)
CALCIUM SERPL-MCNC: 9.7 MG/DL (ref 8.7–10.2)
CHLORIDE SERPL-SCNC: 99 MMOL/L (ref 96–106)
CO2 SERPL-SCNC: 23 MMOL/L (ref 20–29)
CREAT SERPL-MCNC: 1.38 MG/DL (ref 0.76–1.27)
EST. AVERAGE GLUCOSE BLD GHB EST-MCNC: 128 MG/DL
GLOBULIN SER CALC-MCNC: 3.1 G/DL (ref 1.5–4.5)
GLUCOSE SERPL-MCNC: 103 MG/DL (ref 65–99)
HBA1C MFR BLD: 6.1 % (ref 4.8–5.6)
HCT VFR BLD AUTO: 45.5 % (ref 37.5–51)
HGB BLD-MCNC: 15.1 G/DL (ref 13–17.7)
MAGNESIUM SERPL-MCNC: 1.9 MG/DL (ref 1.6–2.3)
POTASSIUM SERPL-SCNC: 4 MMOL/L (ref 3.5–5.2)
PROT SERPL-MCNC: 7.6 G/DL (ref 6–8.5)
SODIUM SERPL-SCNC: 137 MMOL/L (ref 134–144)
TSH SERPL DL<=0.005 MIU/L-ACNC: 1.35 UIU/ML (ref 0.45–4.5)

## 2020-01-20 NOTE — PROGRESS NOTES
Dr. Blake Grounds - Ortho  Chief Complaint   Patient presents with    Follow Up Chronic Condition    Leg Pain     bilateral leg cramps     he is a 61y.o. year old male who presents for follow up chronic medical conditions. Patient with hx of CVA with left sided hemiparesis, HTN, HLD and OA. Patient complains of congestion and persistent dry cough. He complains of episodic muscle cramping. Patient denies F/C, HA, dizziness, SOB, CP, abdominal pain, dysuria, acute myalgias or arthralgias. He is due for lab work. He does need medication refills. Hypertension:  The patient reports:  taking medications as instructed, no medication side effects noted, no TIA's, no chest pain on exertion, no dyspnea on exertion, no swelling of ankles. Lifestyle modification/social history: sedentary     BP Readings from Last 3 Encounters:   01/15/20 114/71   05/29/19 130/88   11/29/18 150/70     Lab Results   Component Value Date/Time    Sodium 137 01/15/2020 12:00 AM    Potassium 4.0 01/15/2020 12:00 AM    Chloride 99 01/15/2020 12:00 AM    CO2 23 01/15/2020 12:00 AM    Glucose 103 (H) 01/15/2020 12:00 AM    BUN 17 01/15/2020 12:00 AM    Creatinine 1.38 (H) 01/15/2020 12:00 AM    BUN/Creatinine ratio 12 01/15/2020 12:00 AM    GFR est AA 64 01/15/2020 12:00 AM    GFR est non-AA 56 (L) 01/15/2020 12:00 AM    Calcium 9.7 01/15/2020 12:00 AM    Bilirubin, total 0.5 01/15/2020 12:00 AM    ALT (SGPT) 32 01/15/2020 12:00 AM    AST (SGOT) 26 01/15/2020 12:00 AM    Alk. phosphatase 61 01/15/2020 12:00 AM    Protein, total 7.6 01/15/2020 12:00 AM    Albumin 4.5 01/15/2020 12:00 AM    A-G Ratio 1.5 01/15/2020 12:00 AM      Patient advised to log blood pressures at home weekly and bring to next visit. Call office as soon as possible if BP's over 140/90 on multiple occasions or with symptoms of dizziness, chest pain, shortness of breath, headache or ankle swelling.   Our goal is to normalize the blood pressure to decrease the risks of strokes and heart attacks. The patient is in agreement with the plan. Hyperlipidemia      Lab Results   Component Value Date/Time    Cholesterol, total 161 2019 09:38 AM    HDL Cholesterol 56 2019 09:38 AM    LDL, calculated 91 2019 09:38 AM    Triglyceride 72 2019 09:38 AM     Lab Results   Component Value Date/Time    ALT (SGPT) 32 01/15/2020 12:00 AM    AST (SGOT) 26 01/15/2020 12:00 AM    Alk. phosphatase 61 01/15/2020 12:00 AM    Bilirubin, total 0.5 01/15/2020 12:00 AM     Our goal is to lower hyperlipidemia to decrease the risks of strokes and heart attacks      Patient Active Problem List   Diagnosis Code    HTN (hypertension) I10    H/O: CVA (cerebrovascular accident) Z80.78    Alcohol abuse F10.10    CHF (congestive heart failure) (formerly [facility]) I50.9    Osteoarthritis, knee M17.10    Knee pain M25.569    Arthritis of knee M17.10    GERD (gastroesophageal reflux disease) K21.9    Neutropenia ([address] D70.9    PE (pulmonary embolism) I26.99    Hyperlipidemia E78.5    Paralysis (formerly [facility]) G83.9    Cough R05     History reviewed. No pertinent surgical history.   Social History     Socioeconomic History    Marital status: LEGALLY      Spouse name: Not on file    Number of children: Not on file    Years of education: Not on file    Highest education level: Not on file   Occupational History    Not on file   Social Needs    Financial resource strain: Not on file    Food insecurity:     Worry: Not on file     Inability: Not on file    Transportation needs:     Medical: Not on file     Non-medical: Not on file   Tobacco Use    Smoking status: Former Smoker     Types: Cigars     Last attempt to quit: 2007     Years since quittin.0    Smokeless tobacco: Never Used   Substance and Sexual Activity    Alcohol use: Yes     Comment: beer occasionally    Drug use: No    Sexual activity: Yes   Lifestyle    Physical activity:     Days per week: Not on file     Minutes per session: Not on file    Stress: Not on file   Relationships    Social connections:     Talks on phone: Not on file     Gets together: Not on file     Attends [employer] service: Not on file     Active member of club or organization: Not on file     Attends meetings of clubs or organizations: Not on file     Relationship status: Not on file    Intimate partner violence:     Fear of current or ex partner: Not on file     Emotionally abused: Not on file     Physically abused: Not on file     Forced sexual activity: Not on file   Other Topics Concern    Not on file   Social History Narrative    Not on file     Family History   Problem Relation Age of Onset    Hypertension Mother      Current Outpatient Medications   Medication Sig    valsartan (DIOVAN) 160 mg tablet TAKE 1 TABLET BY MOUTH ONCE DAILY    hydrOXYzine HCl (ATARAX) 25 mg tablet TAKE 1 TABLET BY MOUTH EVERY 8 HOURS AS NEEDED FOR ITCHING    baclofen (LIORESAL) 10 mg tablet TAKE 1 TABLET BY MOUTH TWICE DAILY    famotidine (PEPCID) 20 mg tablet TAKE 1 TABLET BY MOUTH ONCE DAILY AT  NIGHT    triamterene-hydroCHLOROthiazide (MAXZIDE) 37.5-25 mg per tablet TAKE 1 TABLET BY MOUTH ONCE DAILY    pravastatin (PRAVACHOL) 40 mg tablet TAKE ONE TABLET BY MOUTH ONCE DAILY    Cane isabela Adjustable DX Code:Z86.73    Omeprazole delayed release (PRILOSEC D/R) 20 mg tablet Take 1 Tab by mouth daily.  doxazosin (CARDURA) 2 mg tablet TAKE 1 TABLET BY MOUTH ONCE DAILY    amLODIPine (NORVASC) 10 mg tablet TAKE 1 TABLET BY MOUTH ONCE DAILY    meloxicam (MOBIC) 15 mg tablet TAKE 1 TABLET BY MOUTH ONCE DAILY     No current facility-administered medications for this visit.       Allergies   Allergen Reactions    Ace Inhibitors Other (comments)     Neutropenia      Zestril [Lisinopril] Other (comments)     neutropenia       Review of Systems:  Constitutional: feeling well, denies fatigue, malaise  Skin: see HPi, Negative for rash or lesion  HEENT: Negative for acute hearing or vision changes  Respiratory: Negative for wheezing or SOB  Cardiovascular: Negative for chest pain, dizziness or palpitations  Gastrointestinal: Negative for nausea or abdominal pain  Genital/urinary: Negative for dysuria or voiding dysfunction  Musculoskeletal: Negative for acute myalgia or arthralgia   Neurological: see HPI, Negative for HA  Psychlogical: Negative for depression or anxiety     Vitals:    01/15/20 1049   BP: 114/71   Pulse: (!) 55   Resp: 16   Temp: 97.7 °F (36.5 °C)   TempSrc: Oral   SpO2: 97%   Weight: 167 lb (75.8 kg)   Height: 5' 7\" (1.702 m)       Physical Examination:  General: Well developed, well nourished, in no acute distress  Skin: Warm and dry, no rash or lesion appreciated  Head: Normocephalic, atraumatic  Eyes: Sclera clear, EOMI  Neck: Normal range of motion  Respiratory: Clear to auscultation bilaterally with symmetrical effort  Cardiovascular: Normal S1, S2, Regular rate and rhythm  Extremities: decreased left sided strength, antalgic gait   Neurological: left hemiparesis  Psychological: Active, alert and oriented. Affect appropriate     Diagnoses and all orders for this visit:    1. Essential hypertension  -     METABOLIC PANEL, COMPREHENSIVE  -     HGB & HCT  -     TSH 3RD GENERATION  -     MAGNESIUM    2. Hyperlipidemia, unspecified hyperlipidemia type  -     METABOLIC PANEL, COMPREHENSIVE    3. IFG (impaired fasting glucose)  -     HEMOGLOBIN A1C WITH EAG    4. H/O: CVA (cerebrovascular accident)    5. Hemiparesis of left nondominant side as late effect of cerebrovascular disease, unspecified cerebrovascular disease type ([facility]       Plan of care:  Diagnoses were discussed in detail with patient. Medication risks/benefits/side effects discussed with patient. All of the patient's questions were addressed and answered to apparent satisfaction. The patient understands and agrees with our plan of care.   The patient knows to call back if they have questions about the plan of care or if symptoms change. The patient received an After-Visit Summary which contains VS, diagnoses, orders, allergy and medication lists. Over half of the 40 minutes face to face with EMERSON CAMARGO [place] A [place]ice consisted of counseling and discussing treatment plans in reference to his muscle cramping and multiple co-morbidities. Future Appointments   Date Time Provider Rebecca Patterson   7/15/2020  9:00 AM Darwyn Bamberger, MD [facility] MANDY SCHED       Follow-up and Dispositions    · Return in about 6 months (around 7/15/2020), or if symptoms worsen or fail to improve.

## 2020-04-28 DIAGNOSIS — K21.9 GASTROESOPHAGEAL REFLUX DISEASE WITHOUT ESOPHAGITIS: Chronic | ICD-10-CM

## 2020-04-29 RX ORDER — FAMOTIDINE 20 MG/1
20 TABLET, FILM COATED ORAL
Qty: 90 TAB | Refills: 1 | Status: SHIPPED | OUTPATIENT
Start: 2020-04-29 | End: 2020-06-19 | Stop reason: SDUPTHER

## 2020-06-19 DIAGNOSIS — K21.9 GASTROESOPHAGEAL REFLUX DISEASE WITHOUT ESOPHAGITIS: Chronic | ICD-10-CM

## 2020-06-21 RX ORDER — FAMOTIDINE 20 MG/1
20 TABLET, FILM COATED ORAL
Qty: 90 TAB | Refills: 1 | Status: SHIPPED | OUTPATIENT
Start: 2020-06-21 | End: 2020-10-12 | Stop reason: SDUPTHER

## 2020-10-12 ENCOUNTER — VIRTUAL VISIT (OUTPATIENT)
Dept: FAMILY MEDICINE CLINIC | Age: 60
End: 2020-10-12
Payer: MEDICAID

## 2020-10-12 DIAGNOSIS — L29.9 PRURITIC CONDITION: ICD-10-CM

## 2020-10-12 DIAGNOSIS — R73.01 IFG (IMPAIRED FASTING GLUCOSE): ICD-10-CM

## 2020-10-12 DIAGNOSIS — I10 ESSENTIAL HYPERTENSION: Primary | ICD-10-CM

## 2020-10-12 DIAGNOSIS — E78.5 HYPERLIPIDEMIA, UNSPECIFIED HYPERLIPIDEMIA TYPE: ICD-10-CM

## 2020-10-12 DIAGNOSIS — K21.9 GASTROESOPHAGEAL REFLUX DISEASE WITHOUT ESOPHAGITIS: Chronic | ICD-10-CM

## 2020-10-12 DIAGNOSIS — I10 ESSENTIAL HYPERTENSION WITH GOAL BLOOD PRESSURE LESS THAN 140/90: ICD-10-CM

## 2020-10-12 PROCEDURE — 99443 PR PHYS/QHP TELEPHONE EVALUATION 21-30 MIN: CPT | Performed by: FAMILY MEDICINE

## 2020-10-12 RX ORDER — HYDROXYZINE 25 MG/1
TABLET, FILM COATED ORAL
Qty: 90 TAB | Refills: 0 | Status: SHIPPED | OUTPATIENT
Start: 2020-10-12 | End: 2021-01-14

## 2020-10-12 RX ORDER — FAMOTIDINE 20 MG/1
20 TABLET, FILM COATED ORAL
Qty: 90 TAB | Refills: 1 | Status: SHIPPED | OUTPATIENT
Start: 2020-10-12 | End: 2021-05-19

## 2020-10-12 RX ORDER — VALSARTAN 160 MG/1
TABLET ORAL
Qty: 90 TAB | Refills: 1 | Status: SHIPPED | OUTPATIENT
Start: 2020-10-12 | End: 2021-04-25

## 2020-10-12 RX ORDER — PRAVASTATIN SODIUM 40 MG/1
TABLET ORAL
Qty: 90 TAB | Refills: 1 | Status: SHIPPED | OUTPATIENT
Start: 2020-10-12 | End: 2021-08-11 | Stop reason: SDUPTHER

## 2020-10-12 RX ORDER — TRIAMTERENE/HYDROCHLOROTHIAZID 37.5-25 MG
1 TABLET ORAL DAILY
Qty: 90 TAB | Refills: 1 | Status: SHIPPED | OUTPATIENT
Start: 2020-10-12 | End: 2021-04-25

## 2020-10-12 NOTE — PROGRESS NOTES
Chief Complaint   Patient presents with    Labs     1. Have you been to the ER, urgent care clinic since your last visit? Hospitalized since your last visit? No    2. Have you seen or consulted any other health care providers outside of the [address] [place] [address] since your last visit? Include any pap smears or colon screening.  No    Reviewed record in preparation for visit and have necessary documentation  Pt did not bring medication to office visit for review  Information was given to pt on Advanced Directives, Living Will  Information was given on Shingles Vaccine  Opportunity was given for questions  Goals that were addressed and/or need to be completed after this appointment include:     Health Maintenance Due   Topic Date Due    Pneumococcal 0-64 years (1 of 1 - PPSV23) 12/27/1966    DTaP/Tdap/Td series (1 - Tdap) 12/27/1981    Shingrix Vaccine Age 50> (1 of 2) 12/27/2010    Lipid Screen  05/29/2020    Flu Vaccine (1) 09/01/2020

## 2020-10-14 ENCOUNTER — CLINICAL SUPPORT (OUTPATIENT)
Dept: FAMILY MEDICINE CLINIC | Age: 60
End: 2020-10-14
Payer: MEDICAID

## 2020-10-14 VITALS — TEMPERATURE: 97.6 F

## 2020-10-14 DIAGNOSIS — Z23 ENCOUNTER FOR IMMUNIZATION: Primary | ICD-10-CM

## 2020-10-14 LAB
ALBUMIN SERPL-MCNC: 4.2 G/DL (ref 3.8–4.9)
ALBUMIN/GLOB SERPL: 1.3 {RATIO} (ref 1.2–2.2)
ALP SERPL-CCNC: 81 IU/L (ref 39–117)
ALT SERPL-CCNC: 12 IU/L (ref 0–44)
AST SERPL-CCNC: 16 IU/L (ref 0–40)
BILIRUB SERPL-MCNC: 0.3 MG/DL (ref 0–1.2)
BUN SERPL-MCNC: 11 MG/DL (ref 6–24)
BUN/CREAT SERPL: 10 (ref 9–20)
CALCIUM SERPL-MCNC: 9.7 MG/DL (ref 8.7–10.2)
CHLORIDE SERPL-SCNC: 98 MMOL/L (ref 96–106)
CHOLEST SERPL-MCNC: 275 MG/DL (ref 100–199)
CO2 SERPL-SCNC: 20 MMOL/L (ref 20–29)
CREAT SERPL-MCNC: 1.14 MG/DL (ref 0.76–1.27)
EST. AVERAGE GLUCOSE BLD GHB EST-MCNC: 134 MG/DL
GLOBULIN SER CALC-MCNC: 3.3 G/DL (ref 1.5–4.5)
GLUCOSE SERPL-MCNC: 107 MG/DL (ref 65–99)
HBA1C MFR BLD: 6.3 % (ref 4.8–5.6)
HDLC SERPL-MCNC: 50 MG/DL
LDLC SERPL CALC-MCNC: 193 MG/DL (ref 0–99)
POTASSIUM SERPL-SCNC: 4.3 MMOL/L (ref 3.5–5.2)
PROT SERPL-MCNC: 7.5 G/DL (ref 6–8.5)
SODIUM SERPL-SCNC: 138 MMOL/L (ref 134–144)
TRIGL SERPL-MCNC: 170 MG/DL (ref 0–149)
TSH SERPL DL<=0.005 MIU/L-ACNC: 1.05 UIU/ML (ref 0.45–4.5)
VLDLC SERPL CALC-MCNC: 32 MG/DL (ref 5–40)

## 2020-10-14 PROCEDURE — 90686 IIV4 VACC NO PRSV 0.5 ML IM: CPT | Performed by: FAMILY MEDICINE

## 2020-10-14 PROCEDURE — 90471 IMMUNIZATION ADMIN: CPT | Performed by: FAMILY MEDICINE

## 2020-10-14 NOTE — PROGRESS NOTES
Cassy Ro is a 61 y.o. male evaluated via telephone on 10/14/20. Patient Identity confirmed by . Telephone encounter done in lieu of office visit due to extraordinary circumstances. A state of national and state emergency has been declared by the President and the [place] due to the [employer] pandemic. Pursuant to the emergency declaration under the [facility] [place] [address] [facility] authority and the [employer] and [employer] Act, this Virtual  Visit was conducted, with patient's consent, to reduce the patient's risk of exposure to COVID-19 and provide continuity of care for an established patient. Jena Rdz LPN coordinated virtual visit    Consent:  Patient and/or health care decision maker is aware that he/she may receive a bill for this telephone encounter, depending on his insurance coverage, and has provided verbal consent to proceed: Yes    Physician Location: Office  Patient Location: Home    CC: Follow up of chronic health conditions. Information gathered from patient and/or health care decision maker. HPI: Cassy Ro is a 61 y.o. male who was evaluated by synchronous (real-time) audio technology from his/her home. Patient scheduled  for follow up chronic medical conditions. Patient with hx of CVA with left sided hemiparesis, HTN, HLD and OA. Patient denies F/C, HA, dizziness, SOB, CP, abdominal pain, dysuria, acute myalgias or arthralgias. He is due for lab work. He does need medication refills. Encounter Diagnoses   Name Primary?     Essential hypertension Yes    Hyperlipidemia, unspecified hyperlipidemia type     IFG (impaired fasting glucose)     Essential hypertension with goal blood pressure less than 140/90     Gastroesophageal reflux disease without esophagitis     Pruritic condition          Current Outpatient Medications:     valsartan (DIOVAN) 160 mg tablet, Take 1 tablet by mouth once daily  Indications: high blood pressure, Disp: 90 Tab, Rfl: 1    pravastatin (PRAVACHOL) 40 mg tablet, TAKE ONE TABLET BY MOUTH ONCE DAILY  Indications: high cholesterol, Disp: 90 Tab, Rfl: 1    triamterene-hydroCHLOROthiazide (MAXZIDE) 37.5-25 mg per tablet, Take 1 Tab by mouth daily. Indications: high blood pressure, Disp: 90 Tab, Rfl: 1    famotidine (PEPCID) 20 mg tablet, Take 1 Tab by mouth nightly.  Indications: gastroesophageal reflux disease, Disp: 90 Tab, Rfl: 1    hydrOXYzine HCL (ATARAX) 25 mg tablet, TAKE 1 TABLET BY MOUTH EVERY 8 HOURS AS NEEDED FOR ITCHING, Disp: 90 Tab, Rfl: 0    amLODIPine (NORVASC) 10 mg tablet, Take 1 tablet by mouth once daily, Disp: 90 Tab, Rfl: 0    doxazosin (CARDURA) 2 mg tablet, Take 1 tablet by mouth once daily, Disp: 90 Tab, Rfl: 0    baclofen (LIORESAL) 10 mg tablet, Take 1 tablet by mouth twice daily, Disp: 180 Tab, Rfl: 0    meloxicam (MOBIC) 15 mg tablet, Take 1 tablet by mouth once daily, Disp: 90 Tab, Rfl: 0    Cane isabela, Adjustable DX Code:Z86.73, Disp: 1 Device, Rfl: 0     Allergies   Allergen Reactions    Ace Inhibitors Other (comments)     Neutropenia      Zestril [Lisinopril] Other (comments)     neutropenia        Patient Active Problem List    Diagnosis Date Noted    Cough 11/12/2015    Paralysis ([address] 11/21/2011    Hyperlipidemia 03/10/2011    Neutropenia ([address] 02/28/2011    PE (pulmonary embolism) 02/28/2011    GERD (gastroesophageal reflux disease) 07/19/2010    Osteoarthritis, knee 06/30/2010    Knee pain 06/30/2010    Arthritis of knee 06/30/2010    HTN (hypertension) 04/09/2010    H/O: CVA (cerebrovascular accident) 04/09/2010    Alcohol abuse 04/09/2010    CHF (congestive heart failure) ([address] 04/09/2010        Review of Systems:  Constitutional: Negative for fatigue, malaise  Resp: Negative for cough, wheezing or SOB  CV: Negative for chest pain, dizziness or palpitations  GI: Negative for nausea or abdominal pain  MS: Negative for acute myalgias or arthralgias   Neuro: Negative for HA, weakness or paresthesia  Psych: Negative for depression or anxiety       Assessment/Plan:  Details of this discussion including any medical advice provided: Patient advised as a precaution to stay at home, practice regular hand washing with soap and warm water and to wear a mask and utilize social distancing when necessary to be out in public places. ICD-10-CM ICD-9-CM    1. Essential hypertension  C04 870.5 METABOLIC PANEL, COMPREHENSIVE      TSH 3RD GENERATION      TSH 3RD GENERATION      METABOLIC PANEL, COMPREHENSIVE   2. Hyperlipidemia, unspecified hyperlipidemia type  E78.5 272.4 LIPID PANEL      METABOLIC PANEL, COMPREHENSIVE      METABOLIC PANEL, COMPREHENSIVE      LIPID PANEL      pravastatin (PRAVACHOL) 40 mg tablet   3. IFG (impaired fasting glucose)  T90.70 651.79 METABOLIC PANEL, COMPREHENSIVE      HEMOGLOBIN A1C WITH EAG      HEMOGLOBIN A1C WITH EAG      METABOLIC PANEL, COMPREHENSIVE   4. Essential hypertension with goal blood pressure less than 140/90  I10 401.9 triamterene-hydroCHLOROthiazide (MAXZIDE) 37.5-25 mg per tablet   5. Gastroesophageal reflux disease without esophagitis  K21.9 530.81 famotidine (PEPCID) 20 mg tablet   6. Pruritic condition  L29.9 698.9 hydrOXYzine HCL (ATARAX) 25 mg tablet     Continue current prescribed medications as written. No medication changes at this time. Symptomatic therapy suggested: call prn if symptoms persist or worsen. Total Time: minutes: 21-30 minutes was spent on phone discussing above problems and plan. Patient medical history, prior OV notes, vitals flow sheet, lab results, medications, and allergies were reviewed during this encounter. For phone encounters:  I affirm this is a patient initiated episode with an established Patient who has not had a related appointment within my department in the past 7 days or scheduled within the next 24 hours.     Note: not billable if this call serves to triage the patient into an appointment for the relevant concern    Follow-up and Dispositions    · Return in about 6 months (around 4/12/2021), or if symptoms worsen or fail to improve.          MD CAMERON Pinzon & ADRIENNE SINGH [facility]  10/14/20

## 2020-10-14 NOTE — PROGRESS NOTES
Call patient: Please make sure patient fills his prescription for pravastatin and takes this daily. Cholesterol levels are high. This was overlooked on the letter mailed.

## 2020-10-15 ENCOUNTER — TELEPHONE (OUTPATIENT)
Dept: FAMILY MEDICINE CLINIC | Age: 60
End: 2020-10-15

## 2020-10-15 NOTE — TELEPHONE ENCOUNTER
Phone call to patient, no answer. Detailed letter mailed to patient informing him of the following per Dr. Rohith Sol: Anibal Karimi make sure patient fills his prescription for pravastatin and takes this daily. Cholesterol levels are high. This was overlooked on the letter mailed. \"

## 2020-12-01 DIAGNOSIS — Z86.73 H/O: CVA (CEREBROVASCULAR ACCIDENT): ICD-10-CM

## 2020-12-02 RX ORDER — BACLOFEN 10 MG/1
10 TABLET ORAL 2 TIMES DAILY
Qty: 180 TAB | Refills: 0 | Status: SHIPPED | OUTPATIENT
Start: 2020-12-02 | End: 2021-08-11 | Stop reason: SDUPTHER

## 2021-03-17 ENCOUNTER — VIRTUAL VISIT (OUTPATIENT)
Dept: FAMILY MEDICINE CLINIC | Age: 61
End: 2021-03-17
Payer: MEDICAID

## 2021-03-17 DIAGNOSIS — G89.29 CHRONIC PAIN OF LEFT KNEE: Primary | ICD-10-CM

## 2021-03-17 DIAGNOSIS — M25.562 CHRONIC PAIN OF LEFT KNEE: Primary | ICD-10-CM

## 2021-03-17 PROCEDURE — 99442 PR PHYS/QHP TELEPHONE EVALUATION 11-20 MIN: CPT | Performed by: FAMILY MEDICINE

## 2021-03-17 NOTE — PROGRESS NOTES
1. Have you been to the ER, urgent care clinic since your last visit? Hospitalized since your last visit? No    2. Have you seen or consulted any other health care providers outside of the [address] [place] [address] since your last visit? Include any pap smears or colon screening.  No        Health Maintenance Due   Topic Date Due    Pneumococcal 0-64 years (1 of 1 - PPSV23) Never done    COVID-19 Vaccine (1) Never done    DTaP/Tdap/Td series (1 - Tdap) Never done    Shingrix Vaccine Age 50> (1 of 2) Never done    Colorectal Cancer Screening Combo  12/22/2020

## 2021-03-22 NOTE — PROGRESS NOTES
James Champion is a 61 y.o. male evaluated via telephone on 21. Patient Identity confirmed by . Telephone encounter done in lieu of office visit due to extraordinary circumstances. A state of national and state emergency has been declared by the President and the [place] due to the [employer] pandemic. Pursuant to the emergency declaration under the [facility] [place] [address] [facility] authority and the [employer] and [employer] Act, this Virtual  Visit was conducted, with patient's consent, to reduce the patient's risk of exposure to COVID-19 and provide continuity of care for an established patient. Jaja Hollingsworth LPN coordinated virtual visit    Consent:  Patient and/or health care decision maker is aware that he/she may receive a bill for this telephone encounter, depending on his insurance coverage, and has provided verbal consent to proceed: Yes    Physician Location: Office  Patient Location: Home    CC: Follow up of chronic health conditions. Information gathered from patient and/or health care decision maker. HPI: James Champion is a 61 y.o. male who was evaluated by synchronous (real-time) audio technology from his/her home. Patient asks for referral to Dr. Helio Joel (ortho). He has had knee injection in the past which provided relief for months. Pain has been worsening over the past month. Patient with hx of CVA with left sided hemiparesis, HTN, HLD and OA. Patient feeling good sans other complaints or concerns at this time. Encounter Diagnoses   Name Primary?     Chronic pain of left knee Yes         Current Outpatient Medications:     doxazosin (CARDURA) 2 mg tablet, Take 1 tablet by mouth once daily, Disp: 90 Tab, Rfl: 0    hydrOXYzine HCL (ATARAX) 25 mg tablet, TAKE 1 TABLET BY MOUTH EVERY 8 HOURS AS NEEDED FOR ITCHING, Disp: 90 Tab, Rfl: 0    baclofen (LIORESAL) 10 mg tablet, Take 1 Tab by mouth two (2) times a day., Disp: 180 Tab, Rfl: 0    amLODIPine (NORVASC) 10 mg tablet, Take 1 tablet by mouth once daily, Disp: 90 Tab, Rfl: 1    valsartan (DIOVAN) 160 mg tablet, Take 1 tablet by mouth once daily  Indications: high blood pressure, Disp: 90 Tab, Rfl: 1    pravastatin (PRAVACHOL) 40 mg tablet, TAKE ONE TABLET BY MOUTH ONCE DAILY  Indications: high cholesterol, Disp: 90 Tab, Rfl: 1    triamterene-hydroCHLOROthiazide (MAXZIDE) 37.5-25 mg per tablet, Take 1 Tab by mouth daily. Indications: high blood pressure, Disp: 90 Tab, Rfl: 1    famotidine (PEPCID) 20 mg tablet, Take 1 Tab by mouth nightly. Indications: gastroesophageal reflux disease, Disp: 90 Tab, Rfl: 1    meloxicam (MOBIC) 15 mg tablet, Take 1 tablet by mouth once daily, Disp: 90 Tab, Rfl: 0    Cane isabela, Adjustable DX Code:Z86.73, Disp: 1 Device, Rfl: 0     Allergies   Allergen Reactions    Ace Inhibitors Other (comments)     Neutropenia    Other reaction(s):  Other (comments), Other (comments)  Neutropenia  neutropenia    Zestril [Lisinopril] Other (comments)     neutropenia        Patient Active Problem List    Diagnosis Date Noted    Cough 11/12/2015    [facility] 11/21[facility]2011    Hyperlipidemia 03/10/2011    [facility]rop[facility] 02/28/2011    PE (pulmonary embolism) 02/28/2011    GERD (gastroesophageal reflux disease) 07/19/2010    Osteoarthritis, knee 06/30/2010    Knee pain 06/30/2010    Arthritis of knee 06/30/2010    HTN (hypertension) 04/09/2010    H/O: CVA (cerebrovascular accident) 04/09/2010    Alcohol abuse 04/09/2010    CHF (congestive heart failure) ([facility] 04/09/2010        Review of Systems:  Constitutional: Negative for fatigue, malaise  Resp: Negative for cough, wheezing or SOB  CV: Negative for chest pain, dizziness or palpitations  GI: Negative for nausea or abdominal pain  MS: see HPI  Neuro: Negative for HA, weakness or paresthesia      Assessment/Plan:  Details of this discussion including any medical advice provided: Patient advised as a precaution to stay at home, practice regular hand washing with soap and warm water and to wear a mask and utilize social distancing when necessary to be out in public places. ICD-10-CM ICD-9-CM    1. Chronic pain of left knee  M25.562 719.46 REFERRAL TO ORTHOPEDICS    G89.29 338.29      Continue current prescribed medications as written. No medication changes at this time. Symptomatic therapy suggested: call prn if symptoms persist or worsen. Total Time: minutes: 11-20 minutes was spent on phone discussing above problems and plan. Patient medical history, prior OV notes, vitals flow sheet, lab results, medications, and allergies were reviewed during this encounter. For phone encounters:  I affirm this is a patient initiated episode with an established Patient who has not had a related appointment within my department in the past 7 days or scheduled within the next 24 hours.     Note: not billable if this call serves to triage the patient into an appointment for the relevant concern        MD CAMERON Ohara & ADRIENNE SINGH [facility]  03/21/21

## 2021-04-11 ENCOUNTER — TRANSCRIBE ORDER (OUTPATIENT)
Dept: REGISTRATION | Age: 61
End: 2021-04-11

## 2021-04-11 ENCOUNTER — HOSPITAL ENCOUNTER (OUTPATIENT)
Dept: LAB | Age: 61
Discharge: HOME | End: 2021-04-11
Payer: MEDICAID

## 2021-04-11 DIAGNOSIS — Z01.812 ENCOUNTER FOR PREOPERATIVE SCREENING LABORATORY TESTING FOR COVID-19 VIRUS: Primary | ICD-10-CM

## 2021-04-11 DIAGNOSIS — Z20.822 ENCOUNTER FOR PREOPERATIVE SCREENING LABORATORY TESTING FOR COVID-19 VIRUS: ICD-10-CM

## 2021-04-11 DIAGNOSIS — Z01.812 ENCOUNTER FOR PREOPERATIVE SCREENING LABORATORY TESTING FOR COVID-19 VIRUS: ICD-10-CM

## 2021-04-11 DIAGNOSIS — Z20.822 ENCOUNTER FOR PREOPERATIVE SCREENING LABORATORY TESTING FOR COVID-19 VIRUS: Primary | ICD-10-CM

## 2021-04-11 PROCEDURE — U0003 INFECTIOUS AGENT DETECTION BY NUCLEIC ACID (DNA OR RNA); SEVERE ACUTE RESPIRATORY SYNDROME CORONAVIRUS 2 (SARS-COV-2) (CORONAVIRUS DISEASE [COVID-19]), AMPLIFIED PROBE TECHNIQUE, MAKING USE OF HIGH THROUGHPUT TECHNOLOGIES AS DESCRIBED BY CMS-2020-01-R: HCPCS

## 2021-04-12 ENCOUNTER — TELEPHONE (OUTPATIENT)
Dept: FAMILY MEDICINE CLINIC | Age: 61
End: 2021-04-12

## 2021-04-12 LAB — SARS-COV-2, COV2NT: NOT DETECTED

## 2021-04-12 NOTE — TELEPHONE ENCOUNTER
Pt wife would like you to call her  and tell him he needs to stay quarantined until after his surgery. States he wants to go places.  This needs to be ASAP

## 2021-04-14 NOTE — PROGRESS NOTES
[address]  Endoscopy Preprocedure Instructions      1. On the day of your surgery, please report to registration located on the 2nd floor of the  [facility]. yes    2. You must have a responsible adult to drive you to the hospital, stay at the hospital during your procedure and drive you home. If they leave your procedure will not be started (no exceptions). yes    3. Do not have anything to eat or drink (including water, gum, mints, coffee, and juice) after midnight. This does not apply to the medications you were instructed to take by your physician. yesIf you are currently taking Plavix, Coumadin, Aspirin, or other blood-thinning agents, contact your physician for special instructions. yes,    4. If you are having a procedure that requires bowel prep: We recommend that you have only clear liquids the day before your procedure and begin your bowel prep by 5:00 pm.  You may continue to drink clear liquids until midnight. If for any reason you are not able to complete your prep please notify your physician immediately. yes    5. Have a list of all current medications, including vitamins, herbal supplements and any other over the counter medications. yes    6. If you wear glasses, contacts, dentures and/or hearing aids, they may be removed prior to procedure, please bring a case to store them in. yes    7. You should understand that if you do not follow these instructions your procedure may be cancelled. If your physical condition changes (I.e. fever, cold or flu) please contact your doctor as soon as possible. 8. It is important that you be on time.   If for any reason you are unable to keep your appointment please call )- the day of or your physicians office prior to your scheduled procedure

## 2021-04-15 ENCOUNTER — ANESTHESIA (OUTPATIENT)
Dept: ENDOSCOPY | Age: 61
End: 2021-04-15
Payer: MEDICAID

## 2021-04-15 ENCOUNTER — HOSPITAL ENCOUNTER (OUTPATIENT)
Age: 61
Setting detail: OUTPATIENT SURGERY
Discharge: HOME | End: 2021-04-15
Attending: INTERNAL MEDICINE | Admitting: INTERNAL MEDICINE
Payer: MEDICAID

## 2021-04-15 ENCOUNTER — ANESTHESIA EVENT (OUTPATIENT)
Dept: ENDOSCOPY | Age: 61
End: 2021-04-15
Payer: MEDICAID

## 2021-04-15 VITALS
SYSTOLIC BLOOD PRESSURE: 123 MMHG | HEIGHT: 67 IN | BODY MASS INDEX: 25.26 KG/M2 | RESPIRATION RATE: 14 BRPM | TEMPERATURE: 98.1 F | WEIGHT: 160.94 LBS | DIASTOLIC BLOOD PRESSURE: 77 MMHG | HEART RATE: 66 BPM | OXYGEN SATURATION: 98 %

## 2021-04-15 PROCEDURE — 2709999900 HC NON-CHARGEABLE SUPPLY: Performed by: INTERNAL MEDICINE

## 2021-04-15 PROCEDURE — 76040000019: Performed by: INTERNAL MEDICINE

## 2021-04-15 PROCEDURE — 76060000031 HC ANESTHESIA FIRST 0.5 HR: Performed by: INTERNAL MEDICINE

## 2021-04-15 PROCEDURE — 74011000250 HC RX REV CODE- 250: Performed by: NURSE ANESTHETIST, CERTIFIED REGISTERED

## 2021-04-15 PROCEDURE — 74011250636 HC RX REV CODE- 250/636: Performed by: NURSE ANESTHETIST, CERTIFIED REGISTERED

## 2021-04-15 RX ORDER — SODIUM CHLORIDE 9 MG/ML
INJECTION, SOLUTION INTRAVENOUS
Status: DISCONTINUED | OUTPATIENT
Start: 2021-04-15 | End: 2021-04-15 | Stop reason: HOSPADM

## 2021-04-15 RX ORDER — FLUMAZENIL 0.1 MG/ML
0.2 INJECTION INTRAVENOUS
Status: DISCONTINUED | OUTPATIENT
Start: 2021-04-15 | End: 2021-04-15 | Stop reason: HOSPADM

## 2021-04-15 RX ORDER — MIDAZOLAM HYDROCHLORIDE 1 MG/ML
.25-5 INJECTION, SOLUTION INTRAMUSCULAR; INTRAVENOUS
Status: DISCONTINUED | OUTPATIENT
Start: 2021-04-15 | End: 2021-04-15 | Stop reason: HOSPADM

## 2021-04-15 RX ORDER — LIDOCAINE HYDROCHLORIDE 20 MG/ML
INJECTION, SOLUTION EPIDURAL; INFILTRATION; INTRACAUDAL; PERINEURAL AS NEEDED
Status: DISCONTINUED | OUTPATIENT
Start: 2021-04-15 | End: 2021-04-15 | Stop reason: HOSPADM

## 2021-04-15 RX ORDER — NALOXONE HYDROCHLORIDE 0.4 MG/ML
0.4 INJECTION, SOLUTION INTRAMUSCULAR; INTRAVENOUS; SUBCUTANEOUS
Status: DISCONTINUED | OUTPATIENT
Start: 2021-04-15 | End: 2021-04-15 | Stop reason: HOSPADM

## 2021-04-15 RX ORDER — ATROPINE SULFATE 0.1 MG/ML
0.4 INJECTION INTRAVENOUS
Status: DISCONTINUED | OUTPATIENT
Start: 2021-04-15 | End: 2021-04-15 | Stop reason: HOSPADM

## 2021-04-15 RX ORDER — PROPOFOL 10 MG/ML
INJECTION, EMULSION INTRAVENOUS AS NEEDED
Status: DISCONTINUED | OUTPATIENT
Start: 2021-04-15 | End: 2021-04-15 | Stop reason: HOSPADM

## 2021-04-15 RX ORDER — DEXTROMETHORPHAN/PSEUDOEPHED 2.5-7.5/.8
1.2 DROPS ORAL
Status: DISCONTINUED | OUTPATIENT
Start: 2021-04-15 | End: 2021-04-15 | Stop reason: HOSPADM

## 2021-04-15 RX ORDER — PROPOFOL 10 MG/ML
INJECTION, EMULSION INTRAVENOUS
Status: DISCONTINUED | OUTPATIENT
Start: 2021-04-15 | End: 2021-04-15 | Stop reason: HOSPADM

## 2021-04-15 RX ORDER — EPINEPHRINE 0.1 MG/ML
1 INJECTION INTRACARDIAC; INTRAVENOUS
Status: DISCONTINUED | OUTPATIENT
Start: 2021-04-15 | End: 2021-04-15 | Stop reason: HOSPADM

## 2021-04-15 RX ORDER — SODIUM CHLORIDE 9 MG/ML
50 INJECTION, SOLUTION INTRAVENOUS CONTINUOUS
Status: DISCONTINUED | OUTPATIENT
Start: 2021-04-15 | End: 2021-04-15 | Stop reason: HOSPADM

## 2021-04-15 RX ADMIN — LIDOCAINE HYDROCHLORIDE 60 MG: 20 INJECTION, SOLUTION INTRAVENOUS at 09:36

## 2021-04-15 RX ADMIN — PROPOFOL INJECTABLE EMULSION 30 MG: 10 INJECTION, EMULSION INTRAVENOUS at 09:44

## 2021-04-15 RX ADMIN — PROPOFOL INJECTABLE EMULSION 50 MG: 10 INJECTION, EMULSION INTRAVENOUS at 09:36

## 2021-04-15 RX ADMIN — PROPOFOL INJECTABLE EMULSION 120 MCG/KG/MIN: 10 INJECTION, EMULSION INTRAVENOUS at 09:36

## 2021-04-15 RX ADMIN — LIDOCAINE HYDROCHLORIDE 40 MG: 20 INJECTION, SOLUTION INTRAVENOUS at 09:44

## 2021-04-15 RX ADMIN — SODIUM CHLORIDE: 9 INJECTION, SOLUTION INTRAVENOUS at 09:30

## 2021-04-15 NOTE — PROCEDURES
Souleymane Jeffrey M.D.  [phone]            4/15/2021          Colonoscopy Operative Report  Ferny Ng  :  1960  [facility] Medical Record Number:  [id number]      Indications:    Screening colonoscopy     :  Jonny Zamora MD    Assistant -- None  Implants -- None    Referring Provider: Sobeida Woodward MD    Sedation:  MAC anesthesia Propofol    Pre-Procedural Exam:      Airway: clear,  No airway problems anticipated  Heart: RRR, without gallops or rubs  Lungs: clear bilaterally without wheezes, crackles, or rhonchi  Abdomen: soft, nontender, nondistended, bowel sounds present  Mental Status: awake, alert and oriented to person, place and time     Procedure Details:  After informed consent was obtained with all risks and benefits of procedure explained and preoperative exam completed, the patient was taken to the endoscopy suite and placed in the left lateral decubitus position. Upon sequential sedation as per above, a digital rectal exam was performed. The Olympus videocolonoscope  was inserted in the rectum and carefully advanced to the terminal ileum. The quality of preparation was good. The colonoscope was slowly withdrawn with careful inspection and evaluation between folds. Retroflexion in the rectum was performed. Findings:   Terminal Ileum: normal  Cecum: normal  Ascending Colon: normal  Transverse Colon:     - Diverticulosis  Descending Colon:     - Diverticulosis  Sigmoid:     - Diverticulosis  Rectum: normal    Interventions:  none    Specimen Removed:  none    Complications: None. EBL:  None. Impression:  Moderate left colonic diverticulosis noted, otherwise normal exam    Recommendations:  -Repeat colonoscopy in 10 years   -High fiber diet.    -Resume normal medication(s). Discharge Disposition:  Home in the company of a  when able to ambulate.     Jonny Zamora MD  4/15/2021  9:51 AM

## 2021-04-15 NOTE — H&P
Vera Philippe M.D.  [phone]            History and Physical       NAME:  Hayward Cabot   :   1960   MRN:   [id number]       Referring Physician:  Abel Bhandari MD      Consult Date: 4/15/2021 8:40 AM    Chief Complaint:  Colon cancer screening    History of Present Illness:  Patient is a 61 y.o. who is seen for colon cancer screening. Denies any ongoing GI complaints. PMH:  Past Medical History:   Diagnosis Date    Arthritis of knee 2010    CAD (coronary artery disease)     Congestive heart failure, unspecified     GERD (gastroesophageal reflux disease)     Hypercholesterolemia     Hypertension     [facility]rop[facility] [address] 2011    PE (pulmonary embolism) 2011    Psychiatric disorder     Bipolar     Stroke ([facility]     [facility]     poly-substance       PSH:  Past Surgical History:   Procedure Laterality Date    HX OTHER SURGICAL      IVC Filter       Allergies: Allergies   Allergen Reactions    Ace Inhibitors Other (comments)     Neutropenia    Other reaction(s): Other (comments), Other (comments)  Neutropenia  neutropenia    Zestril [Lisinopril] Other (comments)     neutropenia       Home Medications:  Prior to Admission Medications   Prescriptions Last Dose Informant Patient Reported? Taking? Cane isabela Not Taking at Unknown time  No No   Sig: Adjustable DX Code:Z86.73   amLODIPine (NORVASC) 10 mg tablet 4/15/2021 at Unknown time  No Yes   Sig: Take 1 tablet by mouth once daily   baclofen (LIORESAL) 10 mg tablet 2021 at Unknown time  No Yes   Sig: Take 1 Tab by mouth two (2) times a day. doxazosin (CARDURA) 2 mg tablet 4/15/2021 at Unknown time  No Yes   Sig: Take 1 tablet by mouth once daily   famotidine (PEPCID) 20 mg tablet 2021 at Unknown time  No Yes   Sig: Take 1 Tab by mouth nightly.  Indications: gastroesophageal reflux disease   hydrOXYzine HCL (ATARAX) 25 mg tablet 4/15/2021 at Unknown time  No Yes Sig: TAKE 1 TABLET BY MOUTH EVERY 8 HOURS AS NEEDED FOR ITCHING   meloxicam (MOBIC) 15 mg tablet 2021 at Unknown time  No Yes   Sig: Take 1 tablet by mouth once daily   pravastatin (PRAVACHOL) 40 mg tablet 2021 at Unknown time  No Yes   Sig: TAKE ONE TABLET BY MOUTH ONCE DAILY  Indications: high cholesterol   triamterene-hydroCHLOROthiazide (MAXZIDE) 37.5-25 mg per tablet Not Taking at Unknown time  No No   Sig: Take 1 Tab by mouth daily. Indications: high blood pressure   valsartan (DIOVAN) 160 mg tablet 4/15/2021 at Unknown time  No Yes   Sig: Take 1 tablet by mouth once daily  Indications: high blood pressure      Facility-Administered Medications: None       Hospital Medications:  No current facility-administered medications for this encounter.         Social History:  Social History     Tobacco Use    Smoking status: Former Smoker     Types: Cigars     Quit date: 2007     Years since quittin.2    Smokeless tobacco: Never Used   Substance Use Topics    Alcohol use: Yes     Comment: beer occasionally       Family History:  Family History   Problem Relation Age of Onset    Hypertension Mother              Review of Systems:      Constitutional: negative fever, negative chills, negative weight loss  Eyes:   negative visual changes  ENT:   negative sore throat, tongue or lip swelling  Respiratory:  negative cough, negative dyspnea  Cards:  negative for chest pain, palpitations, lower extremity edema  GI:   See HPI  :  negative for frequency, dysuria  Integument:  negative for rash and pruritus  Heme:  negative for easy bruising and gum/nose bleeding  Musculoskel: negative for myalgias,  back pain and muscle weakness  Neuro: negative for headaches, dizziness, vertigo  Psych:  negative for feelings of anxiety, depression       Objective:     Patient Vitals for the past 8 hrs:   BP Temp Pulse Resp SpO2 Height Weight   04/15/21 0818 (!) 130/96 97.9 °F (36.6 °C) 76 18 98 % 5' 7\" (1.702 m) 73 kg (160 lb 15 oz)     No intake/output data recorded. No intake/output data recorded. EXAM:     NEURO-a&o   HEENT-wnl   LUNGS-clear    COR-regular rate and rhythym     ABD-soft , no tenderness, no rebound, good bs     EXT-no edema     Data Review     No results for input(s): WBC, HGB, HCT, PLT, HGBEXT, HCTEXT, PLTEXT in the last 72 hours. No results for input(s): NA, K, CL, CO2, BUN, CREA, GLU, PHOS, CA in the last 72 hours. No results for input(s): AP, TBIL, TP, ALB, GLOB, GGT, AML, LPSE in the last 72 hours. No lab exists for component: SGOT, GPT, AMYP, HLPSE  No results for input(s): INR, PTP, APTT, INREXT in the last 72 hours. Patient Active Problem List   Diagnosis Code    HTN (hypertension) I10    H/O: CVA (cerebrovascular accident) Z80.78    Alcohol abuse F10.10    CHF (congestive heart failure) (HCC) I50.9    Osteoarthritis, knee M17.10    Knee pain M25.569    Arthritis of knee M17.10    GERD (gastroesophageal reflux disease) K21.9    Neutropenia (HCC) D70.9    PE (pulmonary embolism) I26.99    Hyperlipidemia E78.5    Paralysis (HCC) G83.9    Cough R05      Assessment:   · Colon cancer screening   Plan:   · Colonoscopy today.      Signed By: Eulogio Bloom MD     4/15/2021  8:40 AM

## 2021-04-15 NOTE — ANESTHESIA POSTPROCEDURE EVALUATION
Procedure(s):  COLONOSCOPY. MAC    Anesthesia Post Evaluation        Patient location during evaluation: bedside  Level of consciousness: awake  Pain management: satisfactory to patient  Airway patency: patent  Anesthetic complications: no  Cardiovascular status: acceptable  Respiratory status: acceptable  Hydration status: acceptable        INITIAL Post-op Vital signs:   Vitals Value Taken Time   /77 04/15/21 1010   Temp     Pulse 67 04/15/21 1011   Resp 17 04/15/21 1011   SpO2 98 % 04/15/21 1011   Vitals shown include unvalidated device data.

## 2021-04-15 NOTE — DISCHARGE INSTRUCTIONS
[facility] [facility] [employer]. Ramonita Haile M.D.  [phone]          COLON DISCHARGE INSTRUCTIONS       4/15/2021    Lashell Dougherty  :  1960  [facility] Medical Record Number:  [id number]      COLONOSCOPY FINDINGS:  Your colonoscopy showed: diverticulosis, otherwise normal exam.    DISCOMFORT:  Redness at IV site- apply warm compress to area; if redness or soreness persist- contact your physician  There may be a slight amount of blood passed from the rectum  Gaseous discomfort- walking, belching will help relieve any discomfort  You may not operate a vehicle for 12 hours  You may not engage in an occupation involving machinery or appliances for rest of today  You may not drink alcoholic beverages for at least 12 hours  Avoid making any critical decisions for at least 24 hour  DIET:   High fiber diet. - however -  remember your colon is empty and a heavy meal will produce gas. Avoid these foods:  vegetables, fried / greasy foods, carbonated drinks for today     ACTIVITY:  You may resume your normal daily activities it is recommended that you spend the remainder of the day resting -  avoid any strenuous activity. CALL M.DL[facility] ANY SIGN OF:   Increasing pain, nausea, vomiting  Abdominal distension (swelling)  New increased bleeding (oral or rectal)  Fever (chills)  Pain in chest area  Bloody discharge from nose or mouth   Shortness of breath    Follow-up Instructions:   Call Dr. Eleazar Bonner if any questions or problems. Telephone # [phone]  Biopsy results will be available in  5 to 7 days  Should have a repeat colonoscopy in 10 years.

## 2021-04-15 NOTE — PROGRESS NOTES
Endoscopy discharge instructions have been reviewed and given to patient. The patient verbalized understanding and acceptance of instructions. Dr. Dalia Carvajal discussed with patient and spouse procedure findings and next steps.

## 2021-04-15 NOTE — PROGRESS NOTES
Claudia Laboy  1960  [id number]    Situation:  Verbal report received from: Brendon Gallego RN/Zoey RN  Procedure: Procedure(s):  COLONOSCOPY    Background:    Preoperative diagnosis: COLON SCREENING  Postoperative diagnosis: Kemi Juarez    :  Dr. Robb Frances  Assistant(s): Endoscopy Technician-1: Nathaly Gaffney  Endoscopy RN-1: Julian Summers RN    Specimens: * No specimens in log *  H. Pylori  no    Assessment:  Intra-procedure medications   Anesthesia gave intra-procedure sedation and medications, see anesthesia flow sheet yes    Intravenous fluids: NS@ KVO     Vital signs stable yes    Abdominal assessment: round and soft yes    Recommendation:  Discharge patient per MD order yes.   Family or Friend wife Florin Larson to share finding with family or friend yes

## 2021-04-15 NOTE — ANESTHESIA PREPROCEDURE EVALUATION
Relevant Problems   NEUROLOGY   (+) Alcohol abuse      CARDIOVASCULAR   (+) CHF (congestive heart failure) (HCC)   (+) HTN (hypertension)      GASTROINTESTINAL   (+) GERD (gastroesophageal reflux disease)      ENDOCRINE   (+) Arthritis of knee       Anesthetic History   No history of anesthetic complications            Review of Systems / Medical History  Patient summary reviewed, nursing notes reviewed and pertinent labs reviewed    Pulmonary  Within defined limits                 Neuro/Psych         Psychiatric history (bipolar)     Cardiovascular    Hypertension          CAD         GI/Hepatic/Renal     GERD           Endo/Other        Arthritis     Other Findings   Comments: H/o polysubstance abuse           Physical Exam    Airway  Mallampati: II  TM Distance: 4 - 6 cm  Neck ROM: normal range of motion   Mouth opening: Normal     Cardiovascular    Rhythm: regular  Rate: normal         Dental  No notable dental hx       Pulmonary  Breath sounds clear to auscultation               Abdominal         Other Findings            Anesthetic Plan    ASA: 2  Anesthesia type: MAC            Anesthetic plan and risks discussed with: Patient

## 2021-06-03 DIAGNOSIS — I10 ESSENTIAL HYPERTENSION WITH GOAL BLOOD PRESSURE LESS THAN 140/90: ICD-10-CM

## 2021-06-04 RX ORDER — AMLODIPINE BESYLATE 10 MG/1
TABLET ORAL
Qty: 90 TABLET | Refills: 0 | Status: SHIPPED | OUTPATIENT
Start: 2021-06-04 | End: 2021-08-05

## 2021-08-11 ENCOUNTER — OFFICE VISIT (OUTPATIENT)
Dept: FAMILY MEDICINE CLINIC | Age: 61
End: 2021-08-11
Payer: MEDICAID

## 2021-08-11 VITALS
RESPIRATION RATE: 20 BRPM | WEIGHT: 167 LBS | OXYGEN SATURATION: 97 % | BODY MASS INDEX: 26.21 KG/M2 | HEART RATE: 62 BPM | SYSTOLIC BLOOD PRESSURE: 116 MMHG | TEMPERATURE: 98.4 F | HEIGHT: 67 IN | DIASTOLIC BLOOD PRESSURE: 71 MMHG

## 2021-08-11 DIAGNOSIS — R73.01 IFG (IMPAIRED FASTING GLUCOSE): ICD-10-CM

## 2021-08-11 DIAGNOSIS — M25.562 CHRONIC PAIN OF LEFT KNEE: ICD-10-CM

## 2021-08-11 DIAGNOSIS — E78.5 HYPERLIPIDEMIA, UNSPECIFIED HYPERLIPIDEMIA TYPE: ICD-10-CM

## 2021-08-11 DIAGNOSIS — Z86.73 H/O: CVA (CEREBROVASCULAR ACCIDENT): ICD-10-CM

## 2021-08-11 DIAGNOSIS — G89.29 CHRONIC PAIN OF LEFT KNEE: ICD-10-CM

## 2021-08-11 DIAGNOSIS — K21.9 GASTROESOPHAGEAL REFLUX DISEASE WITHOUT ESOPHAGITIS: Chronic | ICD-10-CM

## 2021-08-11 DIAGNOSIS — I10 ESSENTIAL HYPERTENSION: Primary | ICD-10-CM

## 2021-08-11 PROCEDURE — 93000 ELECTROCARDIOGRAM COMPLETE: CPT | Performed by: FAMILY MEDICINE

## 2021-08-11 PROCEDURE — 99214 OFFICE O/P EST MOD 30 MIN: CPT | Performed by: FAMILY MEDICINE

## 2021-08-11 RX ORDER — DOXAZOSIN 2 MG/1
TABLET ORAL
Qty: 90 TABLET | Refills: 0 | Status: SHIPPED | OUTPATIENT
Start: 2021-08-11 | End: 2021-10-28

## 2021-08-11 RX ORDER — PRAVASTATIN SODIUM 40 MG/1
TABLET ORAL
Qty: 90 TABLET | Refills: 1 | Status: SHIPPED | OUTPATIENT
Start: 2021-08-11 | End: 2022-02-03 | Stop reason: SDUPTHER

## 2021-08-11 RX ORDER — BACLOFEN 10 MG/1
10 TABLET ORAL
Qty: 180 TABLET | Refills: 0 | Status: SHIPPED | OUTPATIENT
Start: 2021-08-11 | End: 2021-09-28

## 2021-08-11 RX ORDER — FAMOTIDINE 20 MG/1
TABLET, FILM COATED ORAL
Qty: 90 TABLET | Refills: 1 | Status: SHIPPED | OUTPATIENT
Start: 2021-08-11 | End: 2022-03-01

## 2021-08-11 RX ORDER — MELOXICAM 15 MG/1
TABLET ORAL
Qty: 90 TABLET | Refills: 0 | Status: CANCELLED | OUTPATIENT
Start: 2021-08-11

## 2021-08-11 NOTE — PROGRESS NOTES
Chief Complaint   Patient presents with    Follow-up     he is a 61y.o. year old male who presents for follow up chronic medical conditions. Patient with hx of CVA with left sided hemiparesis, HTN, HLD and OA. Patient complains of congestion and persistent dry cough. He complains of episodic muscle cramping. Patient denies F/C, HA, dizziness, SOB, CP, abdominal pain, dysuria, acute myalgias or arthralgias. He is due for lab work. He does need medication refills. Hypertension:  The patient reports:  taking medications as instructed, no medication side effects noted, no TIA's, no chest pain on exertion, no dyspnea on exertion, no swelling of ankles. Lifestyle modification/social history: sedentary     BP Readings from Last 3 Encounters:   08/11/21 116/71   04/15/21 123/77   01/15/20 114/71     Lab Results   Component Value Date/Time    Sodium 135 08/11/2021 12:00 AM    Potassium 4.2 08/11/2021 12:00 AM    Chloride 95 (L) 08/11/2021 12:00 AM    CO2 25 08/11/2021 12:00 AM    Glucose 93 08/11/2021 12:00 AM    BUN 16 08/11/2021 12:00 AM    Creatinine 1.16 08/11/2021 12:00 AM    BUN/Creatinine ratio 14 08/11/2021 12:00 AM    GFR est AA 79 08/11/2021 12:00 AM    GFR est non-AA 68 08/11/2021 12:00 AM    Calcium 9.6 08/11/2021 12:00 AM    Bilirubin, total 0.5 08/11/2021 12:00 AM    ALT (SGPT) 26 08/11/2021 12:00 AM    Alk. phosphatase 71 08/11/2021 12:00 AM    Protein, total 7.8 08/11/2021 12:00 AM    Albumin 4.5 08/11/2021 12:00 AM    A-G Ratio 1.4 08/11/2021 12:00 AM      Patient advised to log blood pressures at home weekly and bring to next visit. Call office as soon as possible if BP's over 140/90 on multiple occasions or with symptoms of dizziness, chest pain, shortness of breath, headache or ankle swelling. Our goal is to normalize the blood pressure to decrease the risks of strokes and heart attacks. The patient is in agreement with the plan.     Hyperlipidemia      Lab Results   Component Value Date/Time Cholesterol, total 249 (H) 2021 12:00 AM    HDL Cholesterol 64 2021 12:00 AM    LDL, calculated 160 (H) 2021 12:00 AM    LDL, calculated 91 2019 09:38 AM    Triglyceride 143 2021 12:00 AM     Lab Results   Component Value Date/Time    ALT (SGPT) 26 2021 12:00 AM    Alk.  phosphatase 71 2021 12:00 AM    Bilirubin, total 0.5 2021 12:00 AM     Our goal is to lower hyperlipidemia to decrease the risks of strokes and heart attacks      Patient Active Problem List   Diagnosis Code    HTN (hypertension) I10    H/O: CVA (cerebrovascular accident) Z80.78    Alcohol abuse F10.10    CHF (congestive heart failure) (HCC) I50.9    Osteoarthritis, knee M17.10    Knee pain M25.569    Arthritis of knee M17.10    GERD (gastroesophageal reflux disease) K21.9    Neutropenia (HCC) D70.9    PE (pulmonary embolism) I26.99    Hyperlipidemia E78.5    Paralysis ([address] G83.9    Cough R05     Past Surgical History:   Procedure Laterality Date    COLONOSCOPY N/A 4/15/2021    [facility]ubbs [facility] ENDOSCOPY    HX OTHER SURGICAL      IVC Filter     Social History     Socioeconomic History    Marital status: LEGALLY      Spouse name: Not on file    Number of children: Not on file    Years of education: Not on file    Highest education level: Not on file   Occupational History    Not on file   Tobacco Use    Smoking status: Former Smoker     Types: Cigars     Quit date: 2007     Years since quittin.6    Smokeless tobacco: Never Used   Substance and Sexual Activity    Alcohol use: Yes     Comment: beer occasionally    Drug use: No    Sexual activity: Yes   Other Topics Concern    Not on file   Social History Narrative    Not on file     Social Determinants of Health     Financial Resource Strain:     Difficulty of Paying Living Expenses:    Food Insecurity:     Worried About Running Out of Food in the Last Year:     Harjeet lainez Food in the Last Year:    Transportation Needs:     Lack of Transportation (Medical):  Lack of Transportation (Non-Medical):    Physical Activity:     Days of Exercise per Week:     Minutes of Exercise per Session:    Stress:     Feeling of Stress :    Social Connections:     Frequency of Communication with Friends and Family:     Frequency of Social Gatherings with Friends and Family:     Attends [employer] Services:     Active Member of Clubs or Organizations:     Attends Club or Organization Meetings:     Marital Status:    Intimate Partner Violence:     Fear of Current or Ex-Partner:     Emotionally Abused:     Physically Abused:     Sexually Abused:      Family History   Problem Relation Age of Onset    Hypertension Mother      Current Outpatient Medications   Medication Sig    famotidine (PEPCID) 20 mg tablet Take 1 tablet by mouth nightly    doxazosin (CARDURA) 2 mg tablet Take 1 tablet by mouth once daily    baclofen (LIORESAL) 10 mg tablet Take 1 Tablet by mouth two (2) times daily as needed for Muscle Spasm(s).  pravastatin (PRAVACHOL) 40 mg tablet TAKE ONE TABLET BY MOUTH ONCE DAILY  Indications: high cholesterol    pneumococcal 23-caleb ps vaccine (PNEUMOVAX 23) 25 mcg/0.5 mL injection 0.5 mL by IntraMUSCular route PRIOR TO DISCHARGE for 1 dose.  triamterene-hydroCHLOROthiazide (MAXZIDE) 37.5-25 mg per tablet TAKE 1 TABLET BY MOUTH ONCE DAILY FOR HIGH BLOOD PRESSURE    valsartan (DIOVAN) 160 mg tablet TAKE 1 TABLET BY MOUTH ONCE DAILY FOR HIGH BLOOD PRESSURE    hydrOXYzine HCL (ATARAX) 25 mg tablet TAKE 1 TABLET BY MOUTH EVERY 8 HOURS AS NEEDED FOR ITCHING    amLODIPine (NORVASC) 10 mg tablet Take 1 tablet by mouth once daily    meloxicam (MOBIC) 15 mg tablet Take 1 tablet by mouth once daily    Cane isabela Adjustable DX Code:Z86.73     No current facility-administered medications for this visit.      Allergies   Allergen Reactions    Ace Inhibitors Other (comments) Neutropenia    Other reaction(s): Other (comments), Other (comments)  Neutropenia  neutropenia    Zestril [Lisinopril] Other (comments)     neutropenia       Review of Systems:  Constitutional: feeling well, denies fatigue, malaise  Skin: Negative for rash or lesion  HEENT: Negative for acute hearing or vision changes  Respiratory: Negative for wheezing or SOB  Cardiovascular: Negative for chest pain, dizziness or palpitations  Gastrointestinal: Negative for nausea or abdominal pain  Genital/urinary: Negative for dysuria or voiding dysfunction  Musculoskeletal: Negative for acute myalgia or arthralgia   Neurological: see HPI, Negative for HA  Psychlogical: Negative for depression or anxiety     Vitals:    08/11/21 0812   BP: 116/71   Pulse: 62   Resp: 20   Temp: 98.4 °F (36.9 °C)   SpO2: 97%   Weight: 167 lb (75.8 kg)   Height: 5' 7\" (1.702 m)       Physical Examination:  General: Well developed, well nourished, in no acute distress  Skin: Warm and dry, no rash or lesion appreciated  Head: Normocephalic, atraumatic  Eyes: Sclera clear, EOMI  Neck: Normal range of motion  Respiratory: Clear to auscultation bilaterally with symmetrical effort  Cardiovascular: Normal S1, S2, Regular rate and rhythm  Extremities: decreased left sided strength, antalgic gait, uses cane   Neurological: left hemiparesis  Psychological: Active, alert and oriented. Affect appropriate     Diagnoses and all orders for this visit:    1. Essential hypertension  -     doxazosin (CARDURA) 2 mg tablet; Take 1 tablet by mouth once daily  -     METABOLIC PANEL, COMPREHENSIVE; Future  -     TSH 3RD GENERATION; Future  -     MAGNESIUM; Future  -     AMB POC EKG ROUTINE W/ 12 LEADS, INTER & REP    2. IFG (impaired fasting glucose)  -     METABOLIC PANEL, COMPREHENSIVE; Future  -     HEMOGLOBIN A1C WITH EAG; Future    3.  Hyperlipidemia, unspecified hyperlipidemia type  -     pravastatin (PRAVACHOL) 40 mg tablet; TAKE ONE TABLET BY MOUTH ONCE DAILY Indications: high cholesterol  -     LIPID PANEL; Future  -     METABOLIC PANEL, COMPREHENSIVE; Future  -     AMB POC EKG ROUTINE W/ 12 LEADS, INTER & REP    4. Gastroesophageal reflux disease without esophagitis  -     famotidine (PEPCID) 20 mg tablet; Take 1 tablet by mouth nightly  -     HGB & HCT; Future    5. Chronic pain of left knee    6. H/O: CVA (cerebrovascular accident)  -     baclofen (LIORESAL) 10 mg tablet; Take 1 Tablet by mouth two (2) times daily as needed for Muscle Spasm(s). Other orders  -     pneumococcal 23-caleb ps vaccine (PNEUMOVAX 23) 25 mcg/0.5 mL injection; 0.5 mL by IntraMUSCular route PRIOR TO DISCHARGE for 1 dose. Plan of care:  Diagnoses were discussed in detail with patient. Medications reviewed and appropriate. Patient to continue current prescribed medications as written. Medication risks/benefits/side effects discussed with patient. All of the patient's questions were addressed and answered to apparent satisfaction. The patient understands and agrees with our plan of care. The patient knows to call back if they have questions about the plan of care or if symptoms change. The patient received an After-Visit Summary which contains VS, diagnoses, orders, allergy and medication lists. No future appointments. No future appointments. Follow-up and Dispositions    · Return in about 6 months (around 2/11/2022), or if symptoms worsen or fail to improve.

## 2021-08-11 NOTE — PROGRESS NOTES
1. Have you been to the ER, urgent care clinic since your last visit? Hospitalized since your last visit? No    2. Have you seen or consulted any other health care providers outside of the [address] [place] [address] since your last visit? Include any pap smears or colon screening.  No  Reviewed record in preparation for visit and have necessary documentation  Pt did not bring medication to office visit for review    Goals that were addressed and/or need to be completed during or after this appointment include   Health Maintenance Due   Topic Date Due    Pneumococcal 0-64 years (1 of 2 - PPSV23) Never done    COVID-19 Vaccine (1) Never done    DTaP/Tdap/Td series (1 - Tdap) Never done    Shingrix Vaccine Age 50> (1 of 2) Never done

## 2021-08-12 LAB
ALBUMIN SERPL-MCNC: 4.5 G/DL (ref 3.8–4.9)
ALBUMIN/GLOB SERPL: 1.4 {RATIO} (ref 1.2–2.2)
ALP SERPL-CCNC: 71 IU/L (ref 48–121)
ALT SERPL-CCNC: 26 IU/L (ref 0–44)
AST SERPL-CCNC: 23 IU/L (ref 0–40)
BILIRUB SERPL-MCNC: 0.5 MG/DL (ref 0–1.2)
BUN SERPL-MCNC: 16 MG/DL (ref 8–27)
BUN/CREAT SERPL: 14 (ref 10–24)
CALCIUM SERPL-MCNC: 9.6 MG/DL (ref 8.6–10.2)
CHLORIDE SERPL-SCNC: 95 MMOL/L (ref 96–106)
CHOLEST SERPL-MCNC: 249 MG/DL (ref 100–199)
CO2 SERPL-SCNC: 25 MMOL/L (ref 20–29)
CREAT SERPL-MCNC: 1.16 MG/DL (ref 0.76–1.27)
EST. AVERAGE GLUCOSE BLD GHB EST-MCNC: 137 MG/DL
GLOBULIN SER CALC-MCNC: 3.3 G/DL (ref 1.5–4.5)
GLUCOSE SERPL-MCNC: 93 MG/DL (ref 65–99)
HBA1C MFR BLD: 6.4 % (ref 4.8–5.6)
HCT VFR BLD AUTO: 49.7 % (ref 37.5–51)
HDLC SERPL-MCNC: 64 MG/DL
HGB BLD-MCNC: 16.3 G/DL (ref 13–17.7)
LDLC SERPL CALC-MCNC: 160 MG/DL (ref 0–99)
MAGNESIUM SERPL-MCNC: 1.8 MG/DL (ref 1.6–2.3)
POTASSIUM SERPL-SCNC: 4.2 MMOL/L (ref 3.5–5.2)
PROT SERPL-MCNC: 7.8 G/DL (ref 6–8.5)
SODIUM SERPL-SCNC: 135 MMOL/L (ref 134–144)
TRIGL SERPL-MCNC: 143 MG/DL (ref 0–149)
TSH SERPL DL<=0.005 MIU/L-ACNC: 0.5 UIU/ML (ref 0.45–4.5)
VLDLC SERPL CALC-MCNC: 25 MG/DL (ref 5–40)

## 2021-11-30 ENCOUNTER — OFFICE VISIT (OUTPATIENT)
Dept: FAMILY MEDICINE CLINIC | Age: 61
End: 2021-11-30
Payer: MEDICAID

## 2021-11-30 VITALS
DIASTOLIC BLOOD PRESSURE: 57 MMHG | HEART RATE: 46 BPM | OXYGEN SATURATION: 97 % | TEMPERATURE: 98.1 F | SYSTOLIC BLOOD PRESSURE: 112 MMHG | HEIGHT: 67 IN | WEIGHT: 169.2 LBS | RESPIRATION RATE: 20 BRPM | BODY MASS INDEX: 26.56 KG/M2

## 2021-11-30 DIAGNOSIS — I69.954 HEMIPARESIS OF LEFT NONDOMINANT SIDE AS LATE EFFECT OF CEREBROVASCULAR DISEASE, UNSPECIFIED CEREBROVASCULAR DISEASE TYPE (HCC): ICD-10-CM

## 2021-11-30 DIAGNOSIS — M79.662 BILATERAL CALF PAIN: Primary | ICD-10-CM

## 2021-11-30 DIAGNOSIS — E78.5 HYPERLIPIDEMIA, UNSPECIFIED HYPERLIPIDEMIA TYPE: ICD-10-CM

## 2021-11-30 DIAGNOSIS — M79.661 BILATERAL CALF PAIN: Primary | ICD-10-CM

## 2021-11-30 DIAGNOSIS — I10 ESSENTIAL HYPERTENSION: ICD-10-CM

## 2021-11-30 DIAGNOSIS — Z86.73 H/O: CVA (CEREBROVASCULAR ACCIDENT): ICD-10-CM

## 2021-11-30 PROCEDURE — 99214 OFFICE O/P EST MOD 30 MIN: CPT | Performed by: FAMILY MEDICINE

## 2021-11-30 NOTE — PROGRESS NOTES
1. Have you been to the ER, urgent care clinic since your last visit? Hospitalized since your last visit? No    2. Have you seen or consulted any other health care providers outside of the [address] [place] [address] since your last visit? Include any pap smears or colon screening. No    Reviewed record in preparation for visit and have necessary documentation  Goals that were addressed and/or need to be completed during or after this appointment include     Health Maintenance Due   Topic Date Due    Pneumococcal 0-64 years (1 of 2 - PPSV23) Never done    DTaP/Tdap/Td series (1 - Tdap) Never done    Shingrix Vaccine Age 50> (1 of 2) Never done    Flu Vaccine (1) 09/01/2021       Patient is accompanied by self I have received verbal consent from [address] [place] [address]  to discuss any/all medical information while they are present in the room.

## 2021-12-01 NOTE — PROGRESS NOTES
Chief Complaint   Patient presents with    Leg Pain     b/l calf     he is a 61y.o. year old male who presents for follow up chronic medical conditions. Patient with hx of CVA with left sided hemiparesis, HTN, HLD and OA. Patient complains of persistent calf muscle pain with ambulation. Patient denies F/C, HA, dizziness, SOB, CP, abdominal pain, dysuria, acute weakness or paresthesia. Hypertension:  The patient reports:  taking medications as instructed, no medication side effects noted, no TIA's, no chest pain on exertion, no dyspnea on exertion, no swelling of ankles. Lifestyle modification/social history: sedentary     BP Readings from Last 3 Encounters:   11/30/21 (!) 112/57   08/11/21 116/71   04/15/21 123/77     Lab Results   Component Value Date/Time    Sodium 135 08/11/2021 12:00 AM    Potassium 4.2 08/11/2021 12:00 AM    Chloride 95 (L) 08/11/2021 12:00 AM    CO2 25 08/11/2021 12:00 AM    Glucose 93 08/11/2021 12:00 AM    BUN 16 08/11/2021 12:00 AM    Creatinine 1.16 08/11/2021 12:00 AM    BUN/Creatinine ratio 14 08/11/2021 12:00 AM    GFR est AA 79 08/11/2021 12:00 AM    GFR est non-AA 68 08/11/2021 12:00 AM    Calcium 9.6 08/11/2021 12:00 AM    Bilirubin, total 0.5 08/11/2021 12:00 AM    ALT (SGPT) 26 08/11/2021 12:00 AM    Alk. phosphatase 71 08/11/2021 12:00 AM    Protein, total 7.8 08/11/2021 12:00 AM    Albumin 4.5 08/11/2021 12:00 AM    A-G Ratio 1.4 08/11/2021 12:00 AM      Patient advised to log blood pressures at home weekly and bring to next visit. Call office as soon as possible if BP's over 140/90 on multiple occasions or with symptoms of dizziness, chest pain, shortness of breath, headache or ankle swelling. Our goal is to normalize the blood pressure to decrease the risks of strokes and heart attacks. The patient is in agreement with the plan.     Hyperlipidemia      Lab Results   Component Value Date/Time    Cholesterol, total 249 (H) 08/11/2021 12:00 AM    HDL Cholesterol 64 2021 12:00 AM    LDL, calculated 160 (H) 2021 12:00 AM    LDL, calculated 91 2019 09:38 AM    Triglyceride 143 2021 12:00 AM     Lab Results   Component Value Date/Time    ALT (SGPT) 26 2021 12:00 AM    Alk.  phosphatase 71 2021 12:00 AM    Bilirubin, total 0.5 2021 12:00 AM     Our goal is to lower hyperlipidemia to decrease the risks of strokes and heart attacks      Patient Active Problem List   Diagnosis Code    HTN (hypertension) I10    H/O: CVA (cerebrovascular accident) Z80.78    Alcohol abuse F10.10    CHF (congestive heart failure) (HCC) I50.9    Osteoarthritis, knee M17.10    Knee pain M25.569    Arthritis of knee M17.10    GERD (gastroesophageal reflux disease) K21.9    Neutropenia ([facility]) D70.9    PE (pulmonary embolism) I26.99    Hyperlipidemia E78.5    Paralysis (HCC) G83.9    Cough R05.9     Past Surgical History:   Procedure Laterality Date    COLONOSCOPY N/A 4/15/2021    COLONOSCOPY performed by Shireen Apgar, [facility]    HX OTHER SURGICAL      IVC Filter     Social History     Socioeconomic History    Marital status: LEGALLY      Spouse name: Not on file    Number of children: Not on file    Years of education: Not on file    Highest education level: Not on file   Occupational History    Not on file   Tobacco Use    Smoking status: Former Smoker     Types: Cigars     Quit date: 2007     Years since quittin.9    Smokeless tobacco: Never Used   Substance and Sexual Activity    Alcohol use: Yes     Comment: beer occasionally    Drug use: No    Sexual activity: Yes   Other Topics Concern    Not on file   Social History Narrative    Not on file     Social Determinants of Health     Financial Resource Strain:     Difficulty of Paying Living Expenses: Not on file   Food Insecurity:     Worried About Running Out of Food in the Last Year: Not on file    Harjeet of Food in the Last Year: Not on file Transportation Needs:     Lack of Transportation (Medical): Not on file    Lack of Transportation (Non-Medical):  Not on file   Physical Activity:     Days of Exercise per Week: Not on file    Minutes of Exercise per Session: Not on file   Stress:     Feeling of Stress : Not on file   Social Connections:     Frequency of Communication with Friends and Family: Not on file    Frequency of Social Gatherings with Friends and Family: Not on file    Attends Gnosticism Services: Not on file    Active Member of [address] [place] [address] or Organizations: Not on file    Attends Club or Organization Meetings: Not on file    Marital Status: Not on file   Intimate Partner Violence:     Fear of Current or Ex-Partner: Not on file    Emotionally Abused: Not on file    Physically Abused: Not on file    Sexually Abused: Not on file   Housing Stability:     Unable to Pay for Housing in the Last Year: Not on file    Number of Places Lived in the Last Year: Not on file    Unstable Housing in the Last Year: Not on file     Family History   Problem Relation Age of Onset    Hypertension Mother      Current Outpatient Medications   Medication Sig    valsartan (DIOVAN) 160 mg tablet TAKE 1 TABLET BY MOUTH ONCE DAILY FOR HIGH BLOOD PRESSURE    triamterene-hydroCHLOROthiazide (MAXZIDE) 37.5-25 mg per tablet TAKE 1 TABLET BY MOUTH ONCE DAILY FOR HIGH BLOOD PRESSURE    baclofen (LIORESAL) 10 mg tablet TAKE 1 TABLET BY MOUTH TWICE DAILY AS NEEDED FOR MUSCLE SPASM    doxazosin (CARDURA) 2 mg tablet Take 1 tablet by mouth once daily    hydrOXYzine HCL (ATARAX) 25 mg tablet TAKE 1 TABLET BY MOUTH EVERY 8 HOURS AS NEEDED FOR ITCHING    famotidine (PEPCID) 20 mg tablet Take 1 tablet by mouth nightly    pravastatin (PRAVACHOL) 40 mg tablet TAKE ONE TABLET BY MOUTH ONCE DAILY  Indications: high cholesterol    amLODIPine (NORVASC) 10 mg tablet Take 1 tablet by mouth once daily    meloxicam (MOBIC) 15 mg tablet Take 1 tablet by mouth once daily  Cane isabela Adjustable DX Code:Z86.73    pneumococcal 23-caleb ps vaccine (PNEUMOVAX 23) 25 mcg/0.5 mL injection 0.5 mL by IntraMUSCular route PRIOR TO DISCHARGE for 1 dose. (Patient not taking: Reported on 11/30/2021)     No current facility-administered medications for this visit. Allergies   Allergen Reactions    Ace Inhibitors Other (comments)     Neutropenia    Other reaction(s): Other (comments), Other (comments)  Neutropenia  neutropenia    Zestril [Lisinopril] Other (comments)     neutropenia       Review of Systems:  Constitutional: feeling well, denies fatigue, malaise  Skin: Negative for rash or lesion  HEENT: Negative for acute hearing or vision changes  Respiratory: Negative for wheezing or SOB  Cardiovascular: Negative for chest pain, dizziness or palpitations  Musculoskeletal: see HPI  Neurological: see HPI  Psychlogical: Negative for depression or anxiety     Vitals:    11/30/21 1352 11/30/21 1406   BP: (!) 112/57    Pulse: (!) 37 (!) 46   Resp: 20    Temp: 98.1 °F (36.7 °C)    TempSrc: Oral    SpO2: 97%    Weight: 169 lb 3.2 oz (76.7 kg)    Height: 5' 7\" (1.702 m)        Physical Examination:  General: Well developed, well nourished, in no acute distress  Skin: Warm and dry, no rash or lesion appreciated  Head: Normocephalic, atraumatic  Eyes: Sclera clear, EOMI  Neck: Normal range of motion  Respiratory: Clear to auscultation bilaterally with symmetrical effort  Cardiovascular: Normal S1, S2, Regular rate and rhythm  Extremities: decreased left sided strength, antalgic gait, uses cane   Neurological: left hemiparesis  Psychological: Active, alert and oriented. Affect appropriate     Diagnoses and all orders for this visit:    1. Bilateral calf pain  -     ANKLE BRACHIAL INDEX; Future    2. Essential hypertension    3. Hyperlipidemia, unspecified hyperlipidemia type    4. H/O: CVA (cerebrovascular accident)    5.  Hemiparesis of left nondominant side as late effect of cerebrovascular disease, unspecified cerebrovascular disease type ([facility] [address]       Plan of care:  Diagnoses were discussed in detail with patient. Medications reviewed and appropriate. Patient to continue current prescribed medications as written. Medication risks/benefits/side effects discussed with patient. All of the patient's questions were addressed and answered to apparent satisfaction. The patient understands and agrees with our plan of care. The patient knows to call back if they have questions about the plan of care or if symptoms change. The patient received an After-Visit Summary which contains VS, diagnoses, orders, allergy and medication lists. No future appointments.

## 2022-01-24 ENCOUNTER — OFFICE VISIT (OUTPATIENT)
Dept: FAMILY MEDICINE CLINIC | Age: 62
End: 2022-01-24
Payer: MEDICAID

## 2022-01-24 VITALS
WEIGHT: 168.6 LBS | SYSTOLIC BLOOD PRESSURE: 110 MMHG | BODY MASS INDEX: 26.46 KG/M2 | TEMPERATURE: 98.2 F | HEIGHT: 67 IN | HEART RATE: 90 BPM | RESPIRATION RATE: 18 BRPM | OXYGEN SATURATION: 95 % | DIASTOLIC BLOOD PRESSURE: 73 MMHG

## 2022-01-24 DIAGNOSIS — J06.9 UPPER RESPIRATORY TRACT INFECTION, UNSPECIFIED TYPE: Primary | ICD-10-CM

## 2022-01-24 DIAGNOSIS — I10 ESSENTIAL HYPERTENSION: ICD-10-CM

## 2022-01-24 DIAGNOSIS — Z86.73 H/O: CVA (CEREBROVASCULAR ACCIDENT): ICD-10-CM

## 2022-01-24 DIAGNOSIS — R73.01 IFG (IMPAIRED FASTING GLUCOSE): ICD-10-CM

## 2022-01-24 DIAGNOSIS — I69.954 HEMIPARESIS OF LEFT NONDOMINANT SIDE AS LATE EFFECT OF CEREBROVASCULAR DISEASE, UNSPECIFIED CEREBROVASCULAR DISEASE TYPE (HCC): ICD-10-CM

## 2022-01-24 LAB — HBA1C MFR BLD HPLC: 6.1 %

## 2022-01-24 PROCEDURE — 99214 OFFICE O/P EST MOD 30 MIN: CPT | Performed by: FAMILY MEDICINE

## 2022-01-24 PROCEDURE — 83036 HEMOGLOBIN GLYCOSYLATED A1C: CPT | Performed by: FAMILY MEDICINE

## 2022-01-24 RX ORDER — PROMETHAZINE HYDROCHLORIDE AND DEXTROMETHORPHAN HYDROBROMIDE 6.25; 15 MG/5ML; MG/5ML
5 SYRUP ORAL
Qty: 180 ML | Refills: 0 | Status: SHIPPED | OUTPATIENT
Start: 2022-01-24 | End: 2022-01-31

## 2022-01-24 RX ORDER — MENTHOL
1000 GEL (GRAM) TOPICAL DAILY
COMMUNITY

## 2022-01-24 NOTE — PROGRESS NOTES
Chief Complaint   Patient presents with    Cough     x a few days     he is a 64y.o. year old male who presents for evaluation of congestion and persistent dry cough. Patient with hx of CVA with left sided hemiparesis, HTN, HLD, IFG and OA. Patient denies F/C, HA, dizziness, SOB, CP, abdominal pain, dysuria, acute myalgias or arthralgias. Hypertension:  The patient reports: taking medications as instructed, no medication side effects noted, no TIA's, no chest pain on exertion, no dyspnea on exertion, no swelling of ankles. Lifestyle modification/social history: sedentary     BP Readings from Last 3 Encounters:   01/24/22 110/73   11/30/21 (!) 112/57   08/11/21 116/71     Lab Results   Component Value Date/Time    Sodium 135 08/11/2021 12:00 AM    Potassium 4.2 08/11/2021 12:00 AM    Chloride 95 (L) 08/11/2021 12:00 AM    CO2 25 08/11/2021 12:00 AM    Glucose 93 08/11/2021 12:00 AM    BUN 16 08/11/2021 12:00 AM    Creatinine 1.16 08/11/2021 12:00 AM    BUN/Creatinine ratio 14 08/11/2021 12:00 AM    GFR est AA 79 08/11/2021 12:00 AM    GFR est non-AA 68 08/11/2021 12:00 AM    Calcium 9.6 08/11/2021 12:00 AM    Bilirubin, total 0.5 08/11/2021 12:00 AM    ALT (SGPT) 26 08/11/2021 12:00 AM    Alk. phosphatase 71 08/11/2021 12:00 AM    Protein, total 7.8 08/11/2021 12:00 AM    Albumin 4.5 08/11/2021 12:00 AM    A-G Ratio 1.4 08/11/2021 12:00 AM      Call office as soon as possible if symptoms of dizziness, chest pain, shortness of breath, headache or ankle swelling. Our goal is to normalize the blood pressure to decrease the risks of strokes and heart attacks. The patient is in agreement with the plan.     Hyperlipidemia      Lab Results   Component Value Date/Time    Cholesterol, total 249 (H) 08/11/2021 12:00 AM    HDL Cholesterol 64 08/11/2021 12:00 AM    LDL, calculated 160 (H) 08/11/2021 12:00 AM    LDL, calculated 91 05/29/2019 09:38 AM    Triglyceride 143 08/11/2021 12:00 AM     Lab Results   Component Value Date/Time    ALT (SGPT) 26 08/11/2021 12:00 AM    Alk. phosphatase 71 08/11/2021 12:00 AM    Bilirubin, total 0.5 08/11/2021 12:00 AM     Our goal is to lower hyperlipidemia to decrease the risks of strokes and heart attacks      Patient Active Problem List   Diagnosis Code    HTN (hypertension) I10    H/O: CVA (cerebrovascular accident) Z80.78    Alcohol abuse F10.10    CHF (congestive heart failure) (HCC) I50.9    Osteoarthritis, knee M17.10    Knee pain M25.569    Arthritis of knee M17.10    GERD (gastroesophageal reflux disease) K21.9    Neutropenia ([facility]) D70.9    PE (pulmonary embolism) I26.99    Hyperlipidemia E78.5    Paralysis ([facility]) G83.9    Cough R05.9     Past Surgical History:   Procedure Laterality Date    COLONOSCOPY N/A 4/15/2021    COL[facility] Flavio [facility]re [facility]    HX OTHER SURGICAL      IVC Filter     Social History     Socioeconomic History    Marital status: LEGALLY      Spouse name: Not on file    Number of children: Not on file    Years of education: Not on file    Highest education level: Not on file   Occupational History    Not on file   Tobacco Use    Smoking status: Former Smoker     Types: Cigars     Quit date: 1/1/2007     Years since quitting: 15.0    Smokeless tobacco: Never Used   Substance and Sexual Activity    Alcohol use: Yes     Comment: beer occasionally    Drug use: No    Sexual activity: Yes   Other Topics Concern    Not on file   Social History Narrative    Not on file     Social Determinants of Health     Financial Resource Strain:     Difficulty of Paying Living Expenses: Not on file   Food Insecurity:     Worried About Running Out of Food in the Last Year: Not on file    Harjeet of Food in the Last Year: Not on file   Transportation Needs:     Lack of Transportation (Medical): Not on file    Lack of Transportation (Non-Medical):  Not on file   Physical Activity:     Days of Exercise per Week: Not on file    Minutes of Exercise per Session: Not on file   Stress:     Feeling of Stress : Not on file   Social Connections:     Frequency of Communication with Friends and Family: Not on file    Frequency of Social Gatherings with Friends and Family: Not on file    Attends Yazidism Services: Not on file    Active Member of [address] [place] [address] or Organizations: Not on file    Attends Club or Organization Meetings: Not on file    Marital Status: Not on file   Intimate Partner Violence:     Fear of Current or Ex-Partner: Not on file    Emotionally Abused: Not on file    Physically Abused: Not on file    Sexually Abused: Not on file   Housing Stability:     Unable to Pay for Housing in the Last Year: Not on file    Number of [place] in the Last Year: Not on file    Unstable Housing in the Last Year: Not on file     Family History   Problem Relation Age of Onset    Hypertension Mother      Current Outpatient Medications   Medication Sig    vitamin e (E GEMS) 1,000 unit capsule Take 1,000 Units by mouth daily.  zinc 50 mg tab tablet Take  by mouth daily.  cholecalciferol, vitamin D3, (D3-2000 PO) Take  by mouth.     amLODIPine (NORVASC) 10 mg tablet Take 1 tablet by mouth once daily    valsartan (DIOVAN) 160 mg tablet TAKE 1 TABLET BY MOUTH ONCE DAILY FOR HIGH BLOOD PRESSURE    triamterene-hydroCHLOROthiazide (MAXZIDE) 37.5-25 mg per tablet TAKE 1 TABLET BY MOUTH ONCE DAILY FOR HIGH BLOOD PRESSURE    baclofen (LIORESAL) 10 mg tablet TAKE 1 TABLET BY MOUTH TWICE DAILY AS NEEDED FOR MUSCLE SPASM    doxazosin (CARDURA) 2 mg tablet Take 1 tablet by mouth once daily    hydrOXYzine HCL (ATARAX) 25 mg tablet TAKE 1 TABLET BY MOUTH EVERY 8 HOURS AS NEEDED FOR ITCHING    famotidine (PEPCID) 20 mg tablet Take 1 tablet by mouth nightly    pravastatin (PRAVACHOL) 40 mg tablet TAKE ONE TABLET BY MOUTH ONCE DAILY  Indications: high cholesterol    meloxicam (MOBIC) 15 mg tablet Take 1 tablet by mouth once daily    Carlose isabela Adjustable DX Code:Z86.73     No current facility-administered medications for this visit. Allergies   Allergen Reactions    Ace Inhibitors Other (comments)     Neutropenia    Other reaction(s): Other (comments), Other (comments)  Neutropenia  neutropenia    Zestril [Lisinopril] Other (comments)     neutropenia       Review of Systems:  Constitutional:Negative for fatigue, malaise  Skin: Negative for rash or lesion  HEENT: c/o nasal congestion  Respiratory: c/o dry cough, Negative for wheezing or SOB  Cardiovascular: Negative for chest pain, dizziness or palpitations  Gastrointestinal: Negative for nausea or abdominal pain  Genital/urinary: Negative for dysuria or voiding dysfunction  Musculoskeletal: Negative for acute myalgia or arthralgia   Neurological: see HPI, Negative for HA  Psychlogical: Negative for depression or anxiety     Vitals:    01/24/22 0949   BP: 110/73   Pulse: 90   Resp: 18   Temp: 98.2 °F (36.8 °C)   TempSrc: Oral   SpO2: 95%   Weight: 168 lb 9.6 oz (76.5 kg)   Height: 5' 7\" (1.702 m)       Physical Examination:  General: Well developed, well nourished, in no acute distress  Skin: Warm and dry, no rash or lesion appreciated  Head: Normocephalic, atraumatic  Eyes: Sclera clear, EOMI  Neck: Normal range of motion  Respiratory: Clear to auscultation bilaterally with symmetrical effort  Cardiovascular: Normal S1, S2, Regular rate and rhythm  Extremities: decreased left sided strength, antalgic gait, uses cane   Neurological: left hemiparesis  Psychological: Active, alert and oriented. Affect appropriate     Diagnoses and all orders for this visit:    1. Upper respiratory tract infection, unspecified type    2. IFG (impaired fasting glucose)  -     AMB POC HEMOGLOBIN A1C    3. Essential hypertension    4. Hemiparesis of left nondominant side as late effect of cerebrovascular disease, unspecified cerebrovascular disease type ([facility] [address]    5.  H/O: CVA (cerebrovascular accident)       Plan of care:  Diagnoses were discussed in detail with patient. Medications reviewed and appropriate. Patient to continue current prescribed medications as written. Medication risks/benefits/side effects discussed with patient. All of the patient's questions were addressed and answered to apparent satisfaction. The patient understands and agrees with our plan of care. The patient knows to call back if they have questions about the plan of care or if symptoms change. The patient received an After-Visit Summary which contains VS, diagnoses, orders, allergy and medication lists. No future appointments.

## 2022-01-24 NOTE — PROGRESS NOTES
1. Have you been to the ER, urgent care clinic since your last visit? Hospitalized since your last visit? No    2. Have you seen or consulted any other health care providers outside of the [address] [place] [address] since your last visit? Include any pap smears or colon screening. No    Reviewed record in preparation for visit and have necessary documentation  Pt did not bring medication to office visit for review  Patient is accompanied by self I have received verbal consent from [address] [place] [address] Dr to discuss any/all medical information while they are present in the room.     Goals that were addressed and/or need to be completed during or after this appointment include     Health Maintenance Due   Topic Date Due    Pneumococcal 0-64 years (1 of 2 - PPSV23) Never done    DTaP/Tdap/Td series (1 - Tdap) Never done    Shingrix Vaccine Age 50> (1 of 2) Never done    Flu Vaccine (1) 09/01/2021    COVID-19 Vaccine (3 - Booster for Waneta Dunnings series) 11/08/2021

## 2022-01-27 ENCOUNTER — TELEPHONE (OUTPATIENT)
Dept: FAMILY MEDICINE CLINIC | Age: 62
End: 2022-01-27

## 2022-01-27 DIAGNOSIS — I10 ESSENTIAL HYPERTENSION: ICD-10-CM

## 2022-01-27 DIAGNOSIS — L29.9 PRURITIC CONDITION: ICD-10-CM

## 2022-01-27 DIAGNOSIS — I10 ESSENTIAL HYPERTENSION WITH GOAL BLOOD PRESSURE LESS THAN 140/90: ICD-10-CM

## 2022-01-27 LAB
SARS-COV-2, NAA 2 DAY TAT: NORMAL
SARS-COV-2, NAA: NOT DETECTED

## 2022-01-29 LAB
ALBUMIN SERPL-MCNC: 4 G/DL (ref 3.8–4.8)
ALBUMIN/GLOB SERPL: 1.3 {RATIO} (ref 1.2–2.2)
ALP SERPL-CCNC: 79 IU/L (ref 44–121)
ALT SERPL-CCNC: 17 IU/L (ref 0–44)
AST SERPL-CCNC: 18 IU/L (ref 0–40)
BILIRUB SERPL-MCNC: 0.2 MG/DL (ref 0–1.2)
BUN SERPL-MCNC: 17 MG/DL (ref 8–27)
BUN/CREAT SERPL: 15 (ref 10–24)
CALCIUM SERPL-MCNC: 9.8 MG/DL (ref 8.6–10.2)
CHLORIDE SERPL-SCNC: 97 MMOL/L (ref 96–106)
CHOLEST SERPL-MCNC: 223 MG/DL (ref 100–199)
CO2 SERPL-SCNC: 26 MMOL/L (ref 20–29)
CREAT SERPL-MCNC: 1.13 MG/DL (ref 0.76–1.27)
GLOBULIN SER CALC-MCNC: 3.2 G/DL (ref 1.5–4.5)
GLUCOSE SERPL-MCNC: 96 MG/DL (ref 65–99)
HCT VFR BLD AUTO: 44.3 % (ref 37.5–51)
HDLC SERPL-MCNC: 44 MG/DL
HGB BLD-MCNC: 14.7 G/DL (ref 13–17.7)
LDLC SERPL CALC-MCNC: 118 MG/DL (ref 0–99)
POTASSIUM SERPL-SCNC: 4.3 MMOL/L (ref 3.5–5.2)
PROT SERPL-MCNC: 7.2 G/DL (ref 6–8.5)
SODIUM SERPL-SCNC: 139 MMOL/L (ref 134–144)
TRIGL SERPL-MCNC: 347 MG/DL (ref 0–149)
TSH SERPL-ACNC: 0.93 UIU/ML (ref 0.45–4.5)
VLDLC SERPL CALC-MCNC: 61 MG/DL (ref 5–40)

## 2022-01-29 RX ORDER — TRIAMTERENE/HYDROCHLOROTHIAZID 37.5-25 MG
TABLET ORAL
Qty: 90 TABLET | Refills: 0 | Status: SHIPPED | OUTPATIENT
Start: 2022-01-29 | End: 2022-03-01

## 2022-01-29 RX ORDER — DOXAZOSIN 2 MG/1
TABLET ORAL
Qty: 90 TABLET | Refills: 0 | Status: SHIPPED | OUTPATIENT
Start: 2022-01-29 | End: 2022-04-28

## 2022-01-29 RX ORDER — VALSARTAN 160 MG/1
TABLET ORAL
Qty: 90 TABLET | Refills: 0 | Status: SHIPPED | OUTPATIENT
Start: 2022-01-29 | End: 2022-04-28

## 2022-01-29 RX ORDER — HYDROXYZINE 25 MG/1
TABLET, FILM COATED ORAL
Qty: 90 TABLET | Refills: 0 | Status: SHIPPED | OUTPATIENT
Start: 2022-01-29 | End: 2022-04-28

## 2022-02-03 DIAGNOSIS — E78.5 HYPERLIPIDEMIA, UNSPECIFIED HYPERLIPIDEMIA TYPE: ICD-10-CM

## 2022-02-03 RX ORDER — PRAVASTATIN SODIUM 40 MG/1
TABLET ORAL
Qty: 90 TABLET | Refills: 1 | Status: SHIPPED | OUTPATIENT
Start: 2022-02-03

## 2022-02-16 ENCOUNTER — TELEPHONE (OUTPATIENT)
Dept: FAMILY MEDICINE CLINIC | Age: 62
End: 2022-02-16

## 2022-02-16 NOTE — TELEPHONE ENCOUNTER
Sophie Tirado Can you please call this Pt. He has been calling trying to get an appointment about his leg for over 2 weeks. He finally has transportation but cannot get through to anyone. He has been calling [facility]. Please call Pt. They called him and he explained the transportation problem They said they would call back on Monday two weeks ago. But never called back.  Thankcourtney

## 2022-02-23 ENCOUNTER — HOSPITAL ENCOUNTER (OUTPATIENT)
Dept: VASCULAR SURGERY | Age: 62
Discharge: HOME | End: 2022-02-23
Attending: FAMILY MEDICINE
Payer: MEDICAID

## 2022-02-23 ENCOUNTER — TRANSCRIBE ORDER (OUTPATIENT)
Dept: SCHEDULING | Age: 62
End: 2022-02-23

## 2022-02-23 DIAGNOSIS — M79.605 LEFT LEG PAIN: ICD-10-CM

## 2022-02-23 DIAGNOSIS — M79.604 RIGHT LEG PAIN: ICD-10-CM

## 2022-02-23 DIAGNOSIS — M79.605 LEFT LEG PAIN: Primary | ICD-10-CM

## 2022-02-23 PROCEDURE — 93922 UPR/L XTREMITY ART 2 LEVELS: CPT

## 2022-02-24 LAB
LEFT ABI: 1.19
LEFT POSTERIOR TIBIAL: 147 MMHG
LEFT TBI: 0.7
LEFT TOE PRESSURE: 87 MMHG
RIGHT ABI: 1.04
RIGHT ARM BP: 124 MMHG
RIGHT POSTERIOR TIBIAL: 129 MMHG
RIGHT TBI: 0.73
RIGHT TOE PRESSURE: 91 MMHG
VAS LEFT DORSALIS PEDIS BP: 138 MMHG
VAS RIGHT DORSALIS PEDIS BP: 114 MMHG

## 2022-06-21 ENCOUNTER — OFFICE VISIT (OUTPATIENT)
Dept: FAMILY MEDICINE CLINIC | Age: 62
End: 2022-06-21
Payer: MEDICAID

## 2022-06-21 VITALS
HEART RATE: 78 BPM | WEIGHT: 169.4 LBS | TEMPERATURE: 98.2 F | DIASTOLIC BLOOD PRESSURE: 86 MMHG | RESPIRATION RATE: 16 BRPM | HEIGHT: 67 IN | BODY MASS INDEX: 26.59 KG/M2 | OXYGEN SATURATION: 95 % | SYSTOLIC BLOOD PRESSURE: 126 MMHG

## 2022-06-21 DIAGNOSIS — R07.81 RIB PAIN ON LEFT SIDE: ICD-10-CM

## 2022-06-21 DIAGNOSIS — G89.29 CHRONIC PAIN OF LEFT KNEE: ICD-10-CM

## 2022-06-21 DIAGNOSIS — E78.5 HYPERLIPIDEMIA, UNSPECIFIED HYPERLIPIDEMIA TYPE: ICD-10-CM

## 2022-06-21 DIAGNOSIS — I10 ESSENTIAL HYPERTENSION: Primary | ICD-10-CM

## 2022-06-21 DIAGNOSIS — M25.562 CHRONIC PAIN OF LEFT KNEE: ICD-10-CM

## 2022-06-21 PROCEDURE — 99214 OFFICE O/P EST MOD 30 MIN: CPT | Performed by: FAMILY MEDICINE

## 2022-06-21 RX ORDER — MELOXICAM 15 MG/1
15 TABLET ORAL
Qty: 90 TABLET | Refills: 0 | Status: SHIPPED | OUTPATIENT
Start: 2022-06-21 | End: 2022-10-12 | Stop reason: SDUPTHER

## 2022-06-21 NOTE — PROGRESS NOTES
1. Have you been to the ER, urgent care clinic since your last visit? Hospitalized since your last visit? No    2. Have you seen or consulted any other health care providers outside of the [address] [place] [address] since your last visit? Include any pap smears or colon screening. No    3. For patients aged 39-70: Has the patient had a colonoscopy / FIT/ Cologuard? Yes     If the patient is female:    4. For patients aged 41-77: Has the patient had a mammogram within the past 2 years? NA - based on age or sex      11. For patients aged 21-65: Has the patient had a pap smear? NA - based on age or sex     Reviewed record in preparation for visit and have necessary documentation  Pt did not bring medication to office visit for review  Patient is accompanied by self I have received verbal consent from [address] [place] [address]  to discuss any/all medical information while they are present in the room.     Goals that were addressed and/or need to be completed during or after this appointment include     Health Maintenance Due   Topic Date Due    Pneumococcal 0-64 years (1 - PCV) Never done    DTaP/Tdap/Td series (1 - Tdap) Never done    Shingrix Vaccine Age 50> (1 of 2) Never done    COVID-19 Vaccine (3 - Booster for Reyes Meiers series) 11/08/2021

## 2022-06-26 NOTE — PROGRESS NOTES
Chief Complaint   Patient presents with    Follow Up Chronic Condition     6 month f/u, patient reported he fell this past weekend and is having soreness to left side ribs      he is a 64y.o. year old male who presents for follow up chronic medical conditions. Patient with hx of CVA with left sided hemiparesis, HTN, HLD and OA. Patient complains of left sided rib pain post fall. Hypertension:  The patient reports:  taking medications as instructed, no medication side effects noted, no TIA's, no chest pain on exertion, no dyspnea on exertion, no swelling of ankles. Lifestyle modification/social history: sedentary     BP Readings from Last 3 Encounters:   06/21/22 126/86   01/24/22 110/73   11/30/21 (!) 112/57     Lab Results   Component Value Date/Time    Sodium 139 01/28/2022 12:00 AM    Potassium 4.3 01/28/2022 12:00 AM    Chloride 97 01/28/2022 12:00 AM    CO2 26 01/28/2022 12:00 AM    Glucose 96 01/28/2022 12:00 AM    BUN 17 01/28/2022 12:00 AM    Creatinine 1.13 01/28/2022 12:00 AM    BUN/Creatinine ratio 15 01/28/2022 12:00 AM    GFR est AA 81 01/28/2022 12:00 AM    GFR est non-AA 70 01/28/2022 12:00 AM    Calcium 9.8 01/28/2022 12:00 AM    Bilirubin, total 0.2 01/28/2022 12:00 AM    ALT (SGPT) 17 01/28/2022 12:00 AM    Alk. phosphatase 79 01/28/2022 12:00 AM    Protein, total 7.2 01/28/2022 12:00 AM    Albumin 4.0 01/28/2022 12:00 AM    A-G Ratio 1.3 01/28/2022 12:00 AM      Call office as soon as possible if BP's over 140/90 on multiple occasions or with symptoms of dizziness, chest pain, shortness of breath, headache or ankle swelling. Our goal is to normalize the blood pressure to decrease the risks of strokes and heart attacks. The patient is in agreement with the plan.     Hyperlipidemia      Lab Results   Component Value Date/Time    Cholesterol, total 223 (H) 01/28/2022 12:00 AM    HDL Cholesterol 44 01/28/2022 12:00 AM    LDL, calculated 118 (H) 01/28/2022 12:00 AM    LDL, calculated 91 05/29/2019 09:38 AM    Triglyceride 347 (H) 01/28/2022 12:00 AM     Lab Results   Component Value Date/Time    ALT (SGPT) 17 01/28/2022 12:00 AM    Alk. phosphatase 79 01/28/2022 12:00 AM    Bilirubin, total 0.2 01/28/2022 12:00 AM     Our goal is to lower hyperlipidemia to decrease the risks of strokes and heart attacks      Patient Active Problem List   Diagnosis Code    HTN (hypertension) I10    H/O: CVA (cerebrovascular accident) Z80.78    Alcohol abuse F10.10    CHF (congestive heart failure) ([facility]) I50.9    Osteoarthritis, knee M17.10    Knee pain M25.569    Arthritis of knee M17.10    GERD (gastroesophageal reflux disease) K21.9    Neutropenia ([facility]) D70.9    PE (pulmonary embolism) I26.99    Hyperlipidemia E78.5    Paralysis ([facility]) G83.9    Cough R05.9     Past Surgical History:   Procedure Laterality Date    COLONOSCOPY N/A 4/15/2021    COLONOSCOPY performed by Donna Cabrera MD at [facility]    HX OTHER SURGICAL      IVC Filter     Social History     Socioeconomic History    Marital status: LEGALLY      Spouse name: Not on file    Number of children: Not on file    Years of education: Not on file    Highest education level: Not on file   Occupational History    Not on file   Tobacco Use    Smoking status: Former Smoker     Types: Cigars     Quit date: 1/1/2007     Years since quitting: 15.4    Smokeless tobacco: Never Used   Substance and Sexual Activity    Alcohol use: Yes     Comment: beer occasionally    Drug use: No    Sexual activity: Yes   Other Topics Concern    Not on file   Social History Narrative    Not on file     Social Determinants of Health     Financial Resource Strain:     Difficulty of Paying Living Expenses: Not on file   Food Insecurity:     Worried About Running Out of Food in the Last Year: Not on file    Harjeet of Food in the Last Year: Not on file   Transportation Needs:     Lack of Transportation (Medical):  Not on file    Lack of Transportation (Non-Medical): Not on file   Physical Activity:     Days of Exercise per Week: Not on file    Minutes of Exercise per Session: Not on file   Stress:     Feeling of Stress : Not on file   Social Connections:     Frequency of Communication with Friends and Family: Not on file    Frequency of Social Gatherings with Friends and Family: Not on file    Attends Islam Services: Not on file    Active Member of [address] [place] [address] or Organizations: Not on file    Attends Club or Organization Meetings: Not on file    Marital Status: Not on file   Intimate Partner Violence:     Fear of Current or Ex-Partner: Not on file    Emotionally Abused: Not on file    Physically Abused: Not on file    Sexually Abused: Not on file   Housing Stability:     Unable to Pay for Housing in the Last Year: Not on file    Number of [place] in the Last Year: Not on file    Unstable Housing in the Last Year: Not on file     Family History   Problem Relation Age of Onset    Hypertension Mother      Current Outpatient Medications   Medication Sig    meloxicam (MOBIC) 15 mg tablet Take 1 Tablet by mouth daily as needed for Pain.  famotidine (PEPCID) 20 mg tablet Take 1 tablet by mouth nightly    hydrOXYzine HCL (ATARAX) 25 mg tablet TAKE 1 TABLET BY MOUTH EVERY 8 HOURS AS NEEDED FOR ITCHING    triamterene-hydroCHLOROthiazide (MAXZIDE) 37.5-25 mg per tablet TAKE 1 TABLET BY MOUTH ONCE DAILY FOR HIGH BLOOD PRESSURE    doxazosin (CARDURA) 2 mg tablet Take 1 tablet by mouth once daily    valsartan (DIOVAN) 160 mg tablet TAKE 1 TABLET BY MOUTH ONCE DAILY FOR HIGH BLOOD PRESSURE    amLODIPine (NORVASC) 10 mg tablet Take 1 tablet by mouth once daily    pravastatin (PRAVACHOL) 40 mg tablet TAKE ONE TABLET BY MOUTH ONCE DAILY  Indications: high cholesterol    vitamin e (E GEMS) 1,000 unit capsule Take 1,000 Units by mouth daily.  zinc 50 mg tab tablet Take  by mouth daily.     cholecalciferol, vitamin D3, (D3-2000 PO) Take  by mouth.  baclofen (LIORESAL) 10 mg tablet TAKE 1 TABLET BY MOUTH TWICE DAILY AS NEEDED FOR MUSCLE SPASM    Cane isabela Adjustable DX Code:Z86.73     No current facility-administered medications for this visit. Allergies   Allergen Reactions    Ace Inhibitors Other (comments)     Neutropenia    Other reaction(s): Other (comments), Other (comments)  Neutropenia  neutropenia    Zestril [Lisinopril] Other (comments)     neutropenia       Review of Systems:  Constitutional: feeling well, denies fatigue, malaise  Skin: Negative for rash or lesion  HEENT: Negative for acute hearing or vision changes  Respiratory: Negative for wheezing or SOB  Cardiovascular: Negative for chest pain, dizziness or palpitations  Gastrointestinal: Negative for nausea or abdominal pain  Genital/urinary: Negative for dysuria or voiding dysfunction  Musculoskeletal: Negative for acute myalgia or arthralgia   Neurological: see HPI, Negative for HA  Psychlogical: Negative for depression or anxiety     Vitals:    06/21/22 1326   BP: 126/86   Pulse: 78   Resp: 16   Temp: 98.2 °F (36.8 °C)   TempSrc: Oral   SpO2: 95%   Weight: 169 lb 6.4 oz (76.8 kg)   Height: 5' 7\" (1.702 m)       Physical Examination:  General: Well developed, well nourished, in no acute distress  Skin: Warm and dry  Head: Normocephalic, atraumatic  Eyes: Sclera clear, EOMI  Neck: Normal range of motion  Respiratory: Clear to auscultation bilaterally with symmetrical effort  Cardiovascular: Regular rate and rhythm  Chest: left lateral costal border TTP  Extremities: decreased left sided strength, antalgic gait, uses cane   Neurological: left hemiparesis  Psychological: Active, alert and oriented. Affect appropriate     Diagnoses and all orders for this visit:    1. Essential hypertension    2. Hyperlipidemia, unspecified hyperlipidemia type    3. Rib pain on left side    4. Chronic pain of left knee  -     meloxicam (MOBIC) 15 mg tablet;  Take 1 Tablet by mouth daily as needed for Pain. Plan of care:  Diagnoses were discussed in detail with patient. Medications reviewed and appropriate. Patient to continue current prescribed medications as written. Medication risks/benefits/side effects discussed with patient. All of the patient's questions were addressed and answered to apparent satisfaction. The patient understands and agrees with our plan of care. The patient knows to call back if they have questions about the plan of care or if symptoms change. The patient received an After-Visit Summary which contains VS, diagnoses, orders, allergy and medication lists. No future appointments. No future appointments. Follow-up and Dispositions    · Return in about 6 months (around 12/21/2022), or if symptoms worsen or fail to improve.

## 2022-08-13 DIAGNOSIS — K21.9 GASTROESOPHAGEAL REFLUX DISEASE WITHOUT ESOPHAGITIS: Chronic | ICD-10-CM

## 2022-08-13 DIAGNOSIS — I10 ESSENTIAL HYPERTENSION WITH GOAL BLOOD PRESSURE LESS THAN 140/90: ICD-10-CM

## 2022-08-13 DIAGNOSIS — L29.9 PRURITIC CONDITION: ICD-10-CM

## 2022-08-14 RX ORDER — TRIAMTERENE/HYDROCHLOROTHIAZID 37.5-25 MG
TABLET ORAL
Qty: 90 TABLET | Refills: 0 | Status: SHIPPED | OUTPATIENT
Start: 2022-08-14

## 2022-08-14 RX ORDER — FAMOTIDINE 20 MG/1
TABLET, FILM COATED ORAL
Qty: 90 TABLET | Refills: 0 | Status: SHIPPED | OUTPATIENT
Start: 2022-08-14

## 2022-08-14 RX ORDER — VALSARTAN 160 MG/1
TABLET ORAL
Qty: 90 TABLET | Refills: 0 | Status: SHIPPED | OUTPATIENT
Start: 2022-08-14

## 2022-08-14 RX ORDER — AMLODIPINE BESYLATE 10 MG/1
TABLET ORAL
Qty: 90 TABLET | Refills: 0 | Status: SHIPPED | OUTPATIENT
Start: 2022-08-14 | End: 2022-10-28

## 2022-08-14 RX ORDER — HYDROXYZINE 25 MG/1
TABLET, FILM COATED ORAL
Qty: 90 TABLET | Refills: 0 | Status: SHIPPED | OUTPATIENT
Start: 2022-08-14

## 2022-09-20 DIAGNOSIS — Z86.73 H/O: CVA (CEREBROVASCULAR ACCIDENT): ICD-10-CM

## 2022-09-20 RX ORDER — BACLOFEN 10 MG/1
TABLET ORAL
Qty: 180 TABLET | Refills: 0 | Status: SHIPPED | OUTPATIENT
Start: 2022-09-20

## 2022-09-20 NOTE — TELEPHONE ENCOUNTER
Pt called the medication into [facility] three days ago. I do not see the request. He has been out since Saturday.

## 2022-10-12 ENCOUNTER — OFFICE VISIT (OUTPATIENT)
Dept: FAMILY MEDICINE CLINIC | Age: 62
End: 2022-10-12
Payer: MEDICAID

## 2022-10-12 VITALS
HEIGHT: 67 IN | TEMPERATURE: 98.2 F | WEIGHT: 163.4 LBS | BODY MASS INDEX: 25.65 KG/M2 | DIASTOLIC BLOOD PRESSURE: 75 MMHG | OXYGEN SATURATION: 96 % | RESPIRATION RATE: 18 BRPM | HEART RATE: 82 BPM | SYSTOLIC BLOOD PRESSURE: 117 MMHG

## 2022-10-12 DIAGNOSIS — Z86.73 H/O: CVA (CEREBROVASCULAR ACCIDENT): ICD-10-CM

## 2022-10-12 DIAGNOSIS — M54.50 LUMBAR PAIN: Primary | ICD-10-CM

## 2022-10-12 DIAGNOSIS — I69.954 HEMIPARESIS OF LEFT NONDOMINANT SIDE AS LATE EFFECT OF CEREBROVASCULAR DISEASE, UNSPECIFIED CEREBROVASCULAR DISEASE TYPE (HCC): ICD-10-CM

## 2022-10-12 DIAGNOSIS — R73.01 IFG (IMPAIRED FASTING GLUCOSE): ICD-10-CM

## 2022-10-12 DIAGNOSIS — I10 ESSENTIAL HYPERTENSION: ICD-10-CM

## 2022-10-12 PROCEDURE — 99214 OFFICE O/P EST MOD 30 MIN: CPT | Performed by: FAMILY MEDICINE

## 2022-10-12 RX ORDER — MELOXICAM 15 MG/1
15 TABLET ORAL
Qty: 90 TABLET | Refills: 0 | Status: SHIPPED | OUTPATIENT
Start: 2022-10-12

## 2022-10-12 RX ORDER — PREDNISONE 10 MG/1
10 TABLET ORAL 2 TIMES DAILY
Qty: 10 TABLET | Refills: 0 | Status: SHIPPED | OUTPATIENT
Start: 2022-10-12 | End: 2022-10-17

## 2022-10-12 NOTE — PROGRESS NOTES
1. Have you been to the ER, urgent care clinic since your last visit? Hospitalized since your last visit? No    2. Have you seen or consulted any other health care providers outside of the [address] [place] [address] since your last visit? Include any pap smears or colon screening. No    3. For patients aged 39-70: Has the patient had a colonoscopy / FIT/ Cologuard? Yes - no Care Gap present    If the patient is female:    4. For patients aged 41-77: Has the patient had a mammogram within the past 2 years? NA - based on age or sex      11. For patients aged 21-65: Has the patient had a pap smear? NA - based on age or sex     Reviewed record in preparation for visit and have necessary documentation  Pt did not bring medication to office visit for review  Patient is accompanied by self I have received verbal consent from [address] [place] [address]  to discuss any/all medical information while they are present in the room.     Goals that were addressed and/or need to be completed during or after this appointment include     Health Maintenance Due   Topic Date Due    Pneumococcal 0-64 years (1 - PCV) Never done    DTaP/Tdap/Td series (1 - Tdap) Never done    Shingrix Vaccine Age 50> (1 of 2) Never done    COVID-19 Vaccine (3 - Booster for Moderna series) 11/08/2021    Flu Vaccine (1) 08/01/2022

## 2022-10-12 NOTE — PROGRESS NOTES
Chief Complaint   Patient presents with    Back Pain     Lower back pain x1 week, denies injury      he is a 64y.o. year old male who presents with c/o LBP for 1 week. He denies acute injury. Patient with hx of CVA with left sided hemiparesis, HTN, HLD and OA. BP Readings from Last 3 Encounters:   10/12/22 117/75   06/21/22 126/86   01/24/22 110/73     Wt Readings from Last 3 Encounters:   10/12/22 163 lb 6.4 oz (74.1 kg)   06/21/22 169 lb 6.4 oz (76.8 kg)   01/24/22 168 lb 9.6 oz (76.5 kg)     Body mass index is 25.59 kg/m². Lab Results   Component Value Date/Time    Hemoglobin A1c 6.4 (H) 08/11/2021 12:00 AM    Hemoglobin A1c (POC) 6.1 01/24/2022 10:24 AM        Lab Results   Component Value Date/Time    Sodium 139 01/28/2022 12:00 AM    Potassium 4.3 01/28/2022 12:00 AM    Chloride 97 01/28/2022 12:00 AM    CO2 26 01/28/2022 12:00 AM    Glucose 96 01/28/2022 12:00 AM    BUN 17 01/28/2022 12:00 AM    Creatinine 1.13 01/28/2022 12:00 AM    BUN/Creatinine ratio 15 01/28/2022 12:00 AM    GFR est AA 81 01/28/2022 12:00 AM    GFR est non-AA 70 01/28/2022 12:00 AM    Calcium 9.8 01/28/2022 12:00 AM    Bilirubin, total 0.2 01/28/2022 12:00 AM    ALT (SGPT) 17 01/28/2022 12:00 AM    Alk. phosphatase 79 01/28/2022 12:00 AM    Protein, total 7.2 01/28/2022 12:00 AM    Albumin 4.0 01/28/2022 12:00 AM    A-G Ratio 1.3 01/28/2022 12:00 AM        Lab Results   Component Value Date/Time    Cholesterol, total 223 (H) 01/28/2022 12:00 AM    HDL Cholesterol 44 01/28/2022 12:00 AM    LDL, calculated 118 (H) 01/28/2022 12:00 AM    LDL, calculated 91 05/29/2019 09:38 AM    Triglyceride 347 (H) 01/28/2022 12:00 AM     Lab Results   Component Value Date/Time    ALT (SGPT) 17 01/28/2022 12:00 AM    Alk.  phosphatase 79 01/28/2022 12:00 AM    Bilirubin, total 0.2 01/28/2022 12:00 AM       Patient Active Problem List   Diagnosis Code    HTN (hypertension) I10    H/O: CVA (cerebrovascular accident) Z86.73    Alcohol abuse F10.10 CHF (congestive heart failure) ([facility]) I50.9    Osteoarthritis, knee M17.9    Knee pain M25.569    Arthritis of knee M17.10    GERD (gastroesophageal reflux disease) K21.9    Neutropenia ([facility]) D70.9    PE (pulmonary embolism) I26.99    Hyperlipidemia E78.5    Paralysis ([facility]) G83.9    Cough R05.9     Past Surgical History:   Procedure Laterality Date    COLONOSCOPY N/A 4/15/2021    COLONOSCOPY performed by Girish Gonzalez MD at [facility] END[facility]    HX OTHER SURGICAL      IVC Filter     Social History     Socioeconomic History    Marital status: LEGALLY      Spouse name: Not on file    Number of children: Not on file    Years of education: Not on file    Highest education level: Not on file   Occupational History    Not on file   Tobacco Use    Smoking status: Former     Types: Cigars     Quit date: 1/1/2007     Years since quitting: 15.8    Smokeless tobacco: Never   Substance and Sexual Activity    Alcohol use: Yes     Comment: beer occasionally    Drug use: No    Sexual activity: Yes   Other Topics Concern    Not on file   Social History Narrative    Not on file     Social Determinants of Health     Financial Resource Strain: Low Risk     Difficulty of Paying Living Expenses: Not hard at all   Food Insecurity: No Food Insecurity    Worried About Running Out of Food in the Last Year: Never true    Ran Out of Food in the Last Year: Never true   Transportation Needs: Not on file   Physical Activity: Not on file   Stress: Not on file   Social Connections: Not on file   Intimate Partner Violence: Not on file   Housing Stability: Not on file     Family History   Problem Relation Age of Onset    Hypertension Mother      Current Outpatient Medications   Medication Sig    OTHER Back aid - contains tylenol    meloxicam (MOBIC) 15 mg tablet Take 1 Tablet by mouth daily as needed for Pain.     baclofen (LIORESAL) 10 mg tablet TAKE 1 TABLET BY MOUTH TWICE DAILY AS NEEDED FOR MUSCLE SPASM    triamterene-hydroCHLOROthiazide (MAXZIDE) 37.5-25 mg per tablet TAKE 1 TABLET BY MOUTH ONCE DAILY FOR HIGH BLOOD PRESSURE    hydrOXYzine HCL (ATARAX) 25 mg tablet TAKE 1 TABLET BY MOUTH EVERY 8 HOURS AS NEEDED FOR ITCHING    valsartan (DIOVAN) 160 mg tablet TAKE 1 TABLET BY MOUTH ONCE DAILY FOR HIGH BLOOD PRESSURE    amLODIPine (NORVASC) 10 mg tablet Take 1 tablet by mouth once daily    famotidine (PEPCID) 20 mg tablet Take 1 tablet by mouth nightly    doxazosin (CARDURA) 2 mg tablet Take 1 tablet by mouth once daily    pravastatin (PRAVACHOL) 40 mg tablet TAKE ONE TABLET BY MOUTH ONCE DAILY  Indications: high cholesterol    vitamin e (E GEMS) 1,000 unit capsule Take 1,000 Units by mouth daily. zinc 50 mg tab tablet Take  by mouth daily. cholecalciferol, vitamin D3, (D3-2000 PO) Take  by mouth. Cane isabela Adjustable DX Code:Z86.73     No current facility-administered medications for this visit. Allergies   Allergen Reactions    Ace Inhibitors Other (comments)     Neutropenia    Other reaction(s):  Other (comments), Other (comments)  Neutropenia  neutropenia    Zestril [Lisinopril] Other (comments)     neutropenia       Review of Systems:  Constitutional: feeling well, denies fatigue, malaise  Skin: Negative for rash or lesion  HEENT: Negative for acute hearing or vision changes  Respiratory: Negative for wheezing or SOB  Cardiovascular: Negative for chest pain, dizziness or palpitations  Gastrointestinal: Negative for nausea or abdominal pain  Genital/urinary: Negative for dysuria or voiding dysfunction  Musculoskeletal: see HPI  Neurological: see HPI, Negative for HA  Psychlogical: Negative for depression or anxiety     Vitals:    10/12/22 1455   BP: 117/75   Pulse: 82   Resp: 18   Temp: 98.2 °F (36.8 °C)   TempSrc: Oral   SpO2: 96%   Weight: 163 lb 6.4 oz (74.1 kg)   Height: 5' 7\" (1.702 m)       Physical Examination:  General: Well developed, well nourished, in no acute distress  Skin: Warm and dry  Head: Normocephalic, atraumatic  Eyes: Sclera clear, EOMI  Neck: Normal range of motion  Respiratory: Clear to auscultation bilaterally with symmetrical effort  Cardiovascular: Regular rate and rhythm  Back: b/l lumbar paraspinal TTP  Extremities: decreased left sided strength, antalgic gait, uses cane   Neurological: left hemiparesis  Psychological: Active, alert and oriented. Affect appropriate     Diagnoses and all orders for this visit:    1. Lumbar pain  -     meloxicam (MOBIC) 15 mg tablet; Take 1 Tablet by mouth daily as needed for Pain. -     predniSONE (DELTASONE) 10 mg tablet; Take 10 mg by mouth two (2) times a day for 5 days. 2. Hemiparesis of left nondominant side as late effect of cerebrovascular disease, unspecified cerebrovascular disease type ([facility] [address]    3. H/O: CVA (cerebrovascular accident)    4. Essential hypertension    5. IFG (impaired fasting glucose)     Plan of care:  Diagnoses were discussed in detail with patient. Medications reviewed and appropriate. Patient to continue current prescribed medications as written. Medication risks/benefits/side effects discussed with patient. All of the patient's questions were addressed and answered to apparent satisfaction. The patient understands and agrees with our plan of care. The patient knows to call back if they have questions about the plan of care or if symptoms change. The patient received an After-Visit Summary which contains VS, diagnoses, orders, allergy and medication lists. No future appointments. No future appointments.

## 2022-10-26 DIAGNOSIS — I10 ESSENTIAL HYPERTENSION WITH GOAL BLOOD PRESSURE LESS THAN 140/90: ICD-10-CM

## 2022-10-28 RX ORDER — AMLODIPINE BESYLATE 10 MG/1
TABLET ORAL
Qty: 90 TABLET | Refills: 0 | Status: SHIPPED | OUTPATIENT
Start: 2022-10-28

## 2022-12-05 ENCOUNTER — TELEPHONE (OUTPATIENT)
Dept: FAMILY MEDICINE CLINIC | Age: 62
End: 2022-12-05

## 2022-12-05 DIAGNOSIS — K21.9 GASTROESOPHAGEAL REFLUX DISEASE WITHOUT ESOPHAGITIS: Chronic | ICD-10-CM

## 2022-12-06 RX ORDER — FAMOTIDINE 20 MG/1
TABLET, FILM COATED ORAL
Qty: 90 TABLET | Refills: 0 | Status: SHIPPED | OUTPATIENT
Start: 2022-12-06

## 2023-01-11 RX ORDER — VALSARTAN 160 MG/1
TABLET ORAL
Qty: 90 TABLET | Refills: 0 | Status: SHIPPED | OUTPATIENT
Start: 2023-01-11

## 2023-01-24 DIAGNOSIS — L29.9 PRURITIC CONDITION: ICD-10-CM

## 2023-01-24 DIAGNOSIS — I10 ESSENTIAL HYPERTENSION WITH GOAL BLOOD PRESSURE LESS THAN 140/90: ICD-10-CM

## 2023-01-26 RX ORDER — TRIAMTERENE/HYDROCHLOROTHIAZID 37.5-25 MG
TABLET ORAL
Qty: 90 TABLET | Refills: 0 | Status: SHIPPED | OUTPATIENT
Start: 2023-01-26

## 2023-01-26 RX ORDER — HYDROXYZINE 25 MG/1
TABLET, FILM COATED ORAL
Qty: 90 TABLET | Refills: 0 | Status: SHIPPED | OUTPATIENT
Start: 2023-01-26

## 2023-02-06 DIAGNOSIS — Z86.73 H/O: CVA (CEREBROVASCULAR ACCIDENT): ICD-10-CM

## 2023-02-08 RX ORDER — BACLOFEN 10 MG/1
TABLET ORAL
Qty: 180 TABLET | Refills: 0 | Status: SHIPPED | OUTPATIENT
Start: 2023-02-08

## 2023-02-20 ENCOUNTER — OFFICE VISIT (OUTPATIENT)
Dept: FAMILY MEDICINE CLINIC | Age: 63
End: 2023-02-20
Payer: MEDICAID

## 2023-02-20 VITALS
SYSTOLIC BLOOD PRESSURE: 131 MMHG | DIASTOLIC BLOOD PRESSURE: 80 MMHG | TEMPERATURE: 97.5 F | BODY MASS INDEX: 26.37 KG/M2 | RESPIRATION RATE: 20 BRPM | WEIGHT: 168 LBS | HEIGHT: 67 IN | HEART RATE: 80 BPM | OXYGEN SATURATION: 96 %

## 2023-02-20 DIAGNOSIS — K21.9 GASTROESOPHAGEAL REFLUX DISEASE WITHOUT ESOPHAGITIS: Chronic | ICD-10-CM

## 2023-02-20 DIAGNOSIS — Z86.73 H/O: CVA (CEREBROVASCULAR ACCIDENT): ICD-10-CM

## 2023-02-20 DIAGNOSIS — L29.9 PRURITIC CONDITION: ICD-10-CM

## 2023-02-20 DIAGNOSIS — I10 ESSENTIAL HYPERTENSION: Primary | ICD-10-CM

## 2023-02-20 DIAGNOSIS — M54.50 LUMBAR PAIN: ICD-10-CM

## 2023-02-20 DIAGNOSIS — R73.01 IFG (IMPAIRED FASTING GLUCOSE): ICD-10-CM

## 2023-02-20 DIAGNOSIS — E78.5 HYPERLIPIDEMIA, UNSPECIFIED HYPERLIPIDEMIA TYPE: ICD-10-CM

## 2023-02-20 PROCEDURE — 3074F SYST BP LT 130 MM HG: CPT | Performed by: FAMILY MEDICINE

## 2023-02-20 PROCEDURE — 3078F DIAST BP <80 MM HG: CPT | Performed by: FAMILY MEDICINE

## 2023-02-20 PROCEDURE — 99214 OFFICE O/P EST MOD 30 MIN: CPT | Performed by: FAMILY MEDICINE

## 2023-02-20 RX ORDER — DOXAZOSIN 2 MG/1
2 TABLET ORAL DAILY
Qty: 90 TABLET | Refills: 0 | Status: CANCELLED | OUTPATIENT
Start: 2023-02-20

## 2023-02-20 RX ORDER — HYDROXYZINE 25 MG/1
TABLET, FILM COATED ORAL
Qty: 90 TABLET | Refills: 0 | Status: CANCELLED | OUTPATIENT
Start: 2023-02-20

## 2023-02-20 RX ORDER — TRIAMTERENE/HYDROCHLOROTHIAZID 37.5-25 MG
TABLET ORAL
Qty: 90 TABLET | Refills: 0 | Status: CANCELLED | OUTPATIENT
Start: 2023-02-20

## 2023-02-20 RX ORDER — MELOXICAM 15 MG/1
15 TABLET ORAL
Qty: 90 TABLET | Refills: 0 | Status: CANCELLED | OUTPATIENT
Start: 2023-02-20

## 2023-02-20 RX ORDER — FAMOTIDINE 20 MG/1
20 TABLET, FILM COATED ORAL
Qty: 90 TABLET | Refills: 1 | Status: SHIPPED | OUTPATIENT
Start: 2023-02-20

## 2023-02-20 RX ORDER — AMLODIPINE BESYLATE 10 MG/1
10 TABLET ORAL DAILY
Qty: 90 TABLET | Refills: 1 | Status: SHIPPED | OUTPATIENT
Start: 2023-02-20

## 2023-02-20 NOTE — PROGRESS NOTES
1. Have you been to the ER, urgent care clinic since your last visit? Hospitalized since your last visit? No    2. Have you seen or consulted any other health care providers outside of the [address] [place] [address] since your last visit? Include any pap smears or colon screening. No  Reviewed record in preparation for visit and have necessary documentation  Pt did not bring medication to office visit for review  opportunity was given for questions      3. For patients aged 39-70: Has the patient had a colonoscopy / FIT/ Cologuard? Yes - no Care Gap present      If the patient is female:    4. For patients aged 41-77: Has the patient had a mammogram within the past 2 years? NA - based on age or sex      11. For patients aged 21-65: Has the patient had a pap smear?  NA - based on age or sex      Goals that were addressed and/or need to be completed during or after this appointment include   Health Maintenance Due   Topic Date Due    Pneumococcal 0-64 years (1 - PCV) Never done    DTaP/Tdap/Td series (1 - Tdap) Never done    Shingles Vaccine (1 of 2) Never done    Flu Vaccine (1) 08/01/2022    Lipid Screen  01/28/2023

## 2023-02-22 NOTE — PROGRESS NOTES
Chief Complaint   Patient presents with    Follow Up Chronic Condition     he is a 58y.o. year old male who presents for follow up chronic medical conditions. Patient with hx of CVA with left sided hemiparesis, HTN, HLD and OA. Patient complains of left sided rib pain post fall. Hypertension:  The patient reports:  taking medications as instructed, no medication side effects noted, no TIA's, no chest pain on exertion, no dyspnea on exertion, no swelling of ankles. Lifestyle modification/social history: sedentary     BP Readings from Last 3 Encounters:   02/20/23 131/80   10/12/22 117/75   06/21/22 126/86     Lab Results   Component Value Date/Time    Sodium 139 01/28/2022 12:00 AM    Potassium 4.3 01/28/2022 12:00 AM    Chloride 97 01/28/2022 12:00 AM    CO2 26 01/28/2022 12:00 AM    Glucose 96 01/28/2022 12:00 AM    BUN 17 01/28/2022 12:00 AM    Creatinine 1.13 01/28/2022 12:00 AM    BUN/Creatinine ratio 15 01/28/2022 12:00 AM    GFR est AA 81 01/28/2022 12:00 AM    GFR est non-AA 70 01/28/2022 12:00 AM    Calcium 9.8 01/28/2022 12:00 AM    Bilirubin, total 0.2 01/28/2022 12:00 AM    ALT (SGPT) 17 01/28/2022 12:00 AM    Alk. phosphatase 79 01/28/2022 12:00 AM    Protein, total 7.2 01/28/2022 12:00 AM    Albumin 4.0 01/28/2022 12:00 AM    A-G Ratio 1.3 01/28/2022 12:00 AM      Call office as soon as possible if BP's over 140/90 on multiple occasions or with symptoms of dizziness, chest pain, shortness of breath, headache or ankle swelling. Our goal is to normalize the blood pressure to decrease the risks of strokes and heart attacks. The patient is in agreement with the plan.     Hyperlipidemia      Lab Results   Component Value Date/Time    Cholesterol, total 223 (H) 01/28/2022 12:00 AM    HDL Cholesterol 44 01/28/2022 12:00 AM    LDL, calculated 118 (H) 01/28/2022 12:00 AM    LDL, calculated 91 05/29/2019 09:38 AM    Triglyceride 347 (H) 01/28/2022 12:00 AM     Lab Results   Component Value Date/Time ALT (SGPT) 17 2022 12:00 AM    Alk.  phosphatase 79 2022 12:00 AM    Bilirubin, total 0.2 2022 12:00 AM     Our goal is to lower hyperlipidemia to decrease the risks of strokes and heart attacks      Patient Active Problem List   Diagnosis Code    HTN (hypertension) I10    H/O: CVA (cerebrovascular accident) Z86.73    Alcohol abuse F10.10    CHF (congestive heart failure) (HCC) I50.9    Osteoarthritis, knee M17.9    Knee pain M25.569    Arthritis of knee M17.10    GERD (gastroesophageal reflux disease) K21.9    Neutropenia (HCC) D70.9    PE (pulmonary embolism) I26.99    Hyperlipidemia E78.5    Paralysis (HCC) G83.9    Cough R05.9     Past Surgical History:   Procedure Laterality Date    COLONOSCOPY N/A 4/15/2021    COLONOSCOPY performed by Mo Park MD at [facility] ENDOSCOPY    HX OTHER SURGICAL      IVC Filter     Social History     Socioeconomic History    Marital status: LEGALLY      Spouse name: Not on file    Number of children: Not on file    Years of education: Not on file    Highest education level: Not on file   Occupational History    Not on file   Tobacco Use    Smoking status: Former     Types: Cigars     Quit date: 2007     Years since quittin.1    Smokeless tobacco: Never   Substance and Sexual Activity    Alcohol use: Yes     Comment: beer occasionally    Drug use: No    Sexual activity: Yes   Other Topics Concern    Not on file   Social History Narrative    Not on file     Social Determinants of Health     Financial Resource Strain: Low Risk     Difficulty of Paying Living Expenses: Not hard at all   Food Insecurity: No Food Insecurity    Worried About Running Out of Food in the Last Year: Never true    Ran Out of Food in the Last Year: Never true   Transportation Needs: Not on file   Physical Activity: Not on file   Stress: Not on file   Social Connections: Not on file   Intimate Partner Violence: Not on file   Housing Stability: Not on file     Family History Problem Relation Age of Onset    Hypertension Mother      Current Outpatient Medications   Medication Sig    amLODIPine (NORVASC) 10 mg tablet Take 1 Tablet by mouth daily. famotidine (PEPCID) 20 mg tablet Take 1 Tablet by mouth nightly. Indications: gastroesophageal reflux disease    baclofen (LIORESAL) 10 mg tablet TAKE 1 TABLET BY MOUTH TWICE DAILY AS NEEDED FOR MUSCLE SPASM    hydrOXYzine HCL (ATARAX) 25 mg tablet TAKE 1 TABLET BY MOUTH EVERY 8 HOURS AS NEEDED FOR ITCHING    triamterene-hydroCHLOROthiazide (MAXZIDE) 37.5-25 mg per tablet TAKE 1 TABLET BY MOUTH ONCE DAILY FOR HIGH BLOOD PRESSURE    valsartan (DIOVAN) 160 mg tablet TAKE 1 TABLET BY MOUTH ONCE DAILY FOR HIGH BLOOD PRESSURE    pravastatin (PRAVACHOL) 40 mg tablet TAKE ONE TABLET BY MOUTH ONCE DAILY  Indications: high cholesterol    Cane isabela Adjustable DX Code:Z86.73    OTHER Back aid - contains tylenol    meloxicam (MOBIC) 15 mg tablet Take 1 Tablet by mouth daily as needed for Pain. (Patient not taking: Reported on 2/20/2023)    vitamin e (E GEMS) 1,000 unit capsule Take 1,000 Units by mouth daily. zinc 50 mg tab tablet Take  by mouth daily. cholecalciferol, vitamin D3, (D3-2000 PO) Take  by mouth. No current facility-administered medications for this visit. Allergies   Allergen Reactions    Ace Inhibitors Other (comments)     Neutropenia    Other reaction(s):  Other (comments), Other (comments)  Neutropenia  neutropenia    Zestril [Lisinopril] Other (comments)     neutropenia       Review of Systems:  Constitutional: feeling well, denies fatigue, malaise  Skin: Negative for rash or lesion  HEENT: Negative for acute hearing or vision changes  Respiratory: Negative for wheezing or SOB  Cardiovascular: Negative for chest pain, dizziness or palpitations  Gastrointestinal: Negative for nausea or abdominal pain  Genital/urinary: Negative for dysuria or voiding dysfunction  Musculoskeletal: Negative for acute myalgia or arthralgia Neurological: see HPI, Negative for HA  Psychlogical: Negative for depression or anxiety     Vitals:    02/20/23 1440   BP: 131/80   Pulse: 80   Resp: 20   Temp: 97.5 °F (36.4 °C)   SpO2: 96%   Weight: 168 lb (76.2 kg)   Height: 5' 7\" (1.702 m)       Physical Examination:  General: Well developed, well nourished, in no acute distress  Skin: Warm and dry  Head: Normocephalic, atraumatic  Eyes: Sclera clear, EOMI  Neck: Normal range of motion  Respiratory: Clear to auscultation bilaterally with symmetrical effort  Cardiovascular: Regular rate and rhythm  Chest: left lateral costal border TTP  Extremities: decreased left sided strength, antalgic gait, uses cane   Neurological: left hemiparesis  Psychological: Active, alert and oriented. Affect appropriate     Diagnoses and all orders for this visit:    1. Essential hypertension  -     amLODIPine (NORVASC) 10 mg tablet; Take 1 Tablet by mouth daily.  -     METABOLIC PANEL, COMPREHENSIVE; Future  -     TSH 3RD GENERATION; Future  -     CBC W/O DIFF; Future    2. Hyperlipidemia, unspecified hyperlipidemia type  -     LIPID PANEL; Future  -     METABOLIC PANEL, COMPREHENSIVE; Future    3. IFG (impaired fasting glucose)  -     HEMOGLOBIN A1C WITH EAG; Future    4. Gastroesophageal reflux disease without esophagitis  -     famotidine (PEPCID) 20 mg tablet; Take 1 Tablet by mouth nightly. Indications: gastroesophageal reflux disease  -     CBC W/O DIFF; Future    5. H/O: CVA (cerebrovascular accident)    6. Pruritic condition    7. Lumbar pain     Plan of care:  Diagnoses were discussed in detail with patient. Medications reviewed and appropriate. Patient to continue current prescribed medications as written. Medication risks/benefits/side effects discussed with patient. All of the patient's questions were addressed and answered to apparent satisfaction. The patient understands and agrees with our plan of care.   The patient knows to call back if they have questions about the plan of care or if symptoms change. The patient received an After-Visit Summary which contains VS, diagnoses, orders, allergy and medication lists. No future appointments. No future appointments. Follow-up and Dispositions    Return in about 6 months (around 8/20/2023).

## 2023-05-01 DIAGNOSIS — I10 ESSENTIAL HYPERTENSION WITH GOAL BLOOD PRESSURE LESS THAN 140/90: ICD-10-CM

## 2023-05-01 DIAGNOSIS — L29.9 PRURITIC CONDITION: ICD-10-CM

## 2023-05-02 RX ORDER — VALSARTAN 160 MG/1
TABLET ORAL
Qty: 90 TABLET | Refills: 0 | Status: SHIPPED | OUTPATIENT
Start: 2023-05-02

## 2023-05-02 RX ORDER — HYDROXYZINE 25 MG/1
TABLET, FILM COATED ORAL
Qty: 90 TABLET | Refills: 0 | Status: SHIPPED | OUTPATIENT
Start: 2023-05-02

## 2023-05-02 RX ORDER — TRIAMTERENE/HYDROCHLOROTHIAZID 37.5-25 MG
TABLET ORAL
Qty: 90 TABLET | Refills: 0 | Status: SHIPPED | OUTPATIENT
Start: 2023-05-02

## 2023-05-24 RX ORDER — FAMOTIDINE 20 MG/1
20 TABLET, FILM COATED ORAL
COMMUNITY
Start: 2023-02-20 | End: 2023-07-22

## 2023-05-24 RX ORDER — MULTIVIT WITH MINERALS/LUTEIN
1000 TABLET ORAL DAILY
COMMUNITY

## 2023-05-24 RX ORDER — VALSARTAN 160 MG/1
TABLET ORAL
COMMUNITY
Start: 2023-05-02 | End: 2023-07-22

## 2023-05-24 RX ORDER — HYDROXYZINE HYDROCHLORIDE 25 MG/1
TABLET, FILM COATED ORAL
COMMUNITY
Start: 2023-05-02 | End: 2023-07-22

## 2023-05-24 RX ORDER — BACLOFEN 10 MG/1
TABLET ORAL
COMMUNITY
Start: 2023-02-08

## 2023-05-24 RX ORDER — TRIAMTERENE AND HYDROCHLOROTHIAZIDE 37.5; 25 MG/1; MG/1
TABLET ORAL
COMMUNITY
Start: 2023-05-02 | End: 2023-07-22

## 2023-05-24 RX ORDER — PRAVASTATIN SODIUM 40 MG
TABLET ORAL
COMMUNITY
Start: 2022-02-03

## 2023-05-24 RX ORDER — AMLODIPINE BESYLATE 10 MG/1
10 TABLET ORAL DAILY
COMMUNITY
Start: 2023-02-20 | End: 2023-07-22

## 2023-05-26 ENCOUNTER — TELEPHONE (OUTPATIENT)
Facility: CLINIC | Age: 63
End: 2023-05-26

## 2023-05-26 NOTE — TELEPHONE ENCOUNTER
Called all around and can't get in until Tuesday for dental appointment. Can you get him something to hold him over until then. You can call this number also [phone]. With his health issues what can he take? Please call Pt.

## 2023-07-22 RX ORDER — VALSARTAN 160 MG/1
TABLET ORAL
Qty: 90 TABLET | Refills: 0 | Status: SHIPPED | OUTPATIENT
Start: 2023-07-22

## 2023-07-22 RX ORDER — FAMOTIDINE 20 MG/1
TABLET, FILM COATED ORAL
Qty: 90 TABLET | Refills: 0 | Status: SHIPPED | OUTPATIENT
Start: 2023-07-22

## 2023-07-22 RX ORDER — HYDROXYZINE HYDROCHLORIDE 25 MG/1
TABLET, FILM COATED ORAL
Qty: 90 TABLET | Refills: 0 | OUTPATIENT
Start: 2023-07-22

## 2023-07-22 RX ORDER — AMLODIPINE BESYLATE 10 MG/1
TABLET ORAL
Qty: 90 TABLET | Refills: 0 | Status: SHIPPED | OUTPATIENT
Start: 2023-07-22

## 2023-07-22 RX ORDER — TRIAMTERENE AND HYDROCHLOROTHIAZIDE 37.5; 25 MG/1; MG/1
TABLET ORAL
Qty: 90 TABLET | Refills: 0 | Status: SHIPPED | OUTPATIENT
Start: 2023-07-22

## 2023-07-22 RX ORDER — HYDROXYZINE HYDROCHLORIDE 25 MG/1
TABLET, FILM COATED ORAL
Qty: 90 TABLET | Refills: 0 | Status: SHIPPED | OUTPATIENT
Start: 2023-07-22

## 2023-08-22 ENCOUNTER — OFFICE VISIT (OUTPATIENT)
Facility: CLINIC | Age: 63
End: 2023-08-22
Payer: MEDICAID

## 2023-08-22 VITALS
HEIGHT: 67 IN | WEIGHT: 161 LBS | DIASTOLIC BLOOD PRESSURE: 84 MMHG | RESPIRATION RATE: 19 BRPM | TEMPERATURE: 97.8 F | OXYGEN SATURATION: 98 % | BODY MASS INDEX: 25.27 KG/M2 | HEART RATE: 76 BPM | SYSTOLIC BLOOD PRESSURE: 132 MMHG

## 2023-08-22 DIAGNOSIS — I69.954 HEMIPLEGIA AND HEMIPARESIS FOLLOWING UNSPECIFIED CEREBROVASCULAR DISEASE AFFECTING LEFT NON-DOMINANT SIDE (HCC): ICD-10-CM

## 2023-08-22 DIAGNOSIS — R73.01 IMPAIRED FASTING GLUCOSE: ICD-10-CM

## 2023-08-22 DIAGNOSIS — I10 ESSENTIAL (PRIMARY) HYPERTENSION: Primary | ICD-10-CM

## 2023-08-22 LAB — HBA1C MFR BLD: 5.9 %

## 2023-08-22 PROCEDURE — 3074F SYST BP LT 130 MM HG: CPT | Performed by: FAMILY MEDICINE

## 2023-08-22 PROCEDURE — 3078F DIAST BP <80 MM HG: CPT | Performed by: FAMILY MEDICINE

## 2023-08-22 PROCEDURE — 99214 OFFICE O/P EST MOD 30 MIN: CPT | Performed by: FAMILY MEDICINE

## 2023-08-22 PROCEDURE — 83036 HEMOGLOBIN GLYCOSYLATED A1C: CPT | Performed by: FAMILY MEDICINE

## 2023-08-22 SDOH — ECONOMIC STABILITY: FOOD INSECURITY: WITHIN THE PAST 12 MONTHS, YOU WORRIED THAT YOUR FOOD WOULD RUN OUT BEFORE YOU GOT MONEY TO BUY MORE.: NEVER TRUE

## 2023-08-22 SDOH — ECONOMIC STABILITY: FOOD INSECURITY: WITHIN THE PAST 12 MONTHS, THE FOOD YOU BOUGHT JUST DIDN'T LAST AND YOU DIDN'T HAVE MONEY TO GET MORE.: NEVER TRUE

## 2023-08-22 SDOH — ECONOMIC STABILITY: HOUSING INSECURITY
IN THE LAST 12 MONTHS, WAS THERE A TIME WHEN YOU DID NOT HAVE A STEADY PLACE TO SLEEP OR SLEPT IN A SHELTER (INCLUDING NOW)?: NO

## 2023-08-22 SDOH — ECONOMIC STABILITY: INCOME INSECURITY: HOW HARD IS IT FOR YOU TO PAY FOR THE VERY BASICS LIKE FOOD, HOUSING, MEDICAL CARE, AND HEATING?: NOT HARD AT ALL

## 2023-08-22 ASSESSMENT — PATIENT HEALTH QUESTIONNAIRE - PHQ9
SUM OF ALL RESPONSES TO PHQ QUESTIONS 1-9: 0
2. FEELING DOWN, DEPRESSED OR HOPELESS: 0
SUM OF ALL RESPONSES TO PHQ QUESTIONS 1-9: 0
SUM OF ALL RESPONSES TO PHQ9 QUESTIONS 1 & 2: 0
1. LITTLE INTEREST OR PLEASURE IN DOING THINGS: 0

## 2023-10-06 RX ORDER — BACLOFEN 10 MG/1
TABLET ORAL
Qty: 60 TABLET | Refills: 2 | Status: SHIPPED | OUTPATIENT
Start: 2023-10-06

## 2023-10-31 ENCOUNTER — TELEPHONE (OUTPATIENT)
Facility: CLINIC | Age: 63
End: 2023-10-31

## 2023-10-31 RX ORDER — HYDROXYZINE HYDROCHLORIDE 25 MG/1
TABLET, FILM COATED ORAL
Qty: 90 TABLET | Refills: 0 | Status: SHIPPED | OUTPATIENT
Start: 2023-10-31

## 2023-10-31 RX ORDER — VALSARTAN 160 MG/1
TABLET ORAL
Qty: 90 TABLET | Refills: 0 | Status: SHIPPED | OUTPATIENT
Start: 2023-10-31

## 2023-10-31 RX ORDER — TRIAMTERENE AND HYDROCHLOROTHIAZIDE 37.5; 25 MG/1; MG/1
TABLET ORAL
Qty: 90 TABLET | Refills: 0 | Status: SHIPPED | OUTPATIENT
Start: 2023-10-31

## 2023-10-31 NOTE — TELEPHONE ENCOUNTER
Pt called these into [employer] on Sunday.     [facility], [address] [place] [address] [phone] [facility] [phone]  P: [phone]  F: [phone]          triamterene-hydroCHLOROthiazide (MAXZIDE-25) 37.5-25 MG per tablet   07/22/23  --  Dahlia Scherer MD     TAKE 1 TABLET BY MOUTH ONCE DAILY FOR HIGH BLOOD PRESSURE    valsartan (DIOVAN) 160 MG tablet             valsartan (DIOVAN) 160 MG tablet   07/22/23  --  Dahlia Scherer MD     TAKE 1 TABLET BY MOUTH ONCE DAILY FOR HIGH BLOOD PRESSURE       hydrOXYzine HCl (ATARAX) 25 MG tablet   07/22/23  --  Dahlia Scherer MD     TAKE 1 TABLET BY MOUTH EVERY 8 HOURS AS NEEDED FOR ITCHING

## 2023-12-04 RX ORDER — AMLODIPINE BESYLATE 10 MG/1
TABLET ORAL
Qty: 90 TABLET | Refills: 1 | Status: SHIPPED | OUTPATIENT
Start: 2023-12-04

## 2024-01-31 RX ORDER — HYDROXYZINE HYDROCHLORIDE 25 MG/1
TABLET, FILM COATED ORAL
Qty: 90 TABLET | Refills: 0 | Status: SHIPPED | OUTPATIENT
Start: 2024-01-31

## 2024-01-31 RX ORDER — TRIAMTERENE AND HYDROCHLOROTHIAZIDE 37.5; 25 MG/1; MG/1
TABLET ORAL
Qty: 90 TABLET | Refills: 0 | Status: SHIPPED | OUTPATIENT
Start: 2024-01-31

## 2024-01-31 RX ORDER — VALSARTAN 160 MG/1
TABLET ORAL
Qty: 90 TABLET | Refills: 0 | Status: SHIPPED | OUTPATIENT
Start: 2024-01-31

## 2024-02-22 ENCOUNTER — OFFICE VISIT (OUTPATIENT)
Facility: CLINIC | Age: 64
End: 2024-02-22
Payer: MEDICAID

## 2024-02-22 VITALS
OXYGEN SATURATION: 96 % | SYSTOLIC BLOOD PRESSURE: 103 MMHG | DIASTOLIC BLOOD PRESSURE: 67 MMHG | HEART RATE: 75 BPM | TEMPERATURE: 98.4 F | HEIGHT: 67 IN | WEIGHT: 166.8 LBS | RESPIRATION RATE: 18 BRPM | BODY MASS INDEX: 26.18 KG/M2

## 2024-02-22 DIAGNOSIS — R73.01 IMPAIRED FASTING GLUCOSE: ICD-10-CM

## 2024-02-22 DIAGNOSIS — E78.49 OTHER HYPERLIPIDEMIA: ICD-10-CM

## 2024-02-22 DIAGNOSIS — I10 ESSENTIAL (PRIMARY) HYPERTENSION: Primary | ICD-10-CM

## 2024-02-22 DIAGNOSIS — Z86.73 H/O: CVA (CEREBROVASCULAR ACCIDENT): ICD-10-CM

## 2024-02-22 DIAGNOSIS — I69.954 HEMIPLEGIA AND HEMIPARESIS FOLLOWING UNSPECIFIED CEREBROVASCULAR DISEASE AFFECTING LEFT NON-DOMINANT SIDE (HCC): ICD-10-CM

## 2024-02-22 PROCEDURE — 3078F DIAST BP <80 MM HG: CPT | Performed by: FAMILY MEDICINE

## 2024-02-22 PROCEDURE — 99214 OFFICE O/P EST MOD 30 MIN: CPT | Performed by: FAMILY MEDICINE

## 2024-02-22 PROCEDURE — 3074F SYST BP LT 130 MM HG: CPT | Performed by: FAMILY MEDICINE

## 2024-02-22 ASSESSMENT — PATIENT HEALTH QUESTIONNAIRE - PHQ9
SUM OF ALL RESPONSES TO PHQ9 QUESTIONS 1 & 2: 0
1. LITTLE INTEREST OR PLEASURE IN DOING THINGS: 0
SUM OF ALL RESPONSES TO PHQ QUESTIONS 1-9: 0
2. FEELING DOWN, DEPRESSED OR HOPELESS: 0
SUM OF ALL RESPONSES TO PHQ QUESTIONS 1-9: 0

## 2024-02-22 NOTE — PROGRESS NOTES
Chief Complaint   Patient presents with    6 Month Follow-Up     Chronic Condition       \"Have you been to the ER, urgent care clinic since your last visit?  Hospitalized since your last visit?\"    NO    “Have you seen or consulted any other health care providers outside of [facility] since your last visit?”    NO              Health Maintenance Due   Topic Date Due    Pneumococcal 0-64 years Vaccine (1 - PCV) Never done    HIV screen  Never done    DTaP/Tdap/Td vaccine (1 - Tdap) Never done    Shingles vaccine (1 of 2) Never done    Respiratory Syncytial Virus (RSV) Pregnant or age 60 yrs+ (1 - 1-dose 60+ series) Never done    Flu vaccine (1) 08/01/2023    COVID-19 Vaccine (4 - 2023-24 season) 09/01/2023    Lipids  03/03/2024        
      Plan of care:  Diagnoses were discussed in detail with patient.   Medications reviewed and appropriate.   Patient to continue current prescribed medications as written.    Medication risks/benefits/side effects discussed with patient.   All of the patient's questions were addressed and answered to apparent satisfaction.   The patient understands and agrees with our plan of care.  The patient knows to call back if they have questions about the plan of care or if symptoms change.  The patient received an After-Visit Summary which contains VS, diagnoses, orders, allergy and medication lists.      Future Appointments   Date Time Provider Department Center   8/22/2024  9:00 AM Jerrod Martinez MD [facility]           Follow-up and Dispositions    Return in about 6 months (around 8/22/2024).

## 2024-02-23 LAB
ALBUMIN SERPL-MCNC: 4.2 G/DL (ref 3.9–4.9)
ALBUMIN/GLOB SERPL: 1.4 {RATIO} (ref 1.2–2.2)
ALP SERPL-CCNC: 77 IU/L (ref 44–121)
ALT SERPL-CCNC: 20 IU/L (ref 0–44)
AST SERPL-CCNC: 21 IU/L (ref 0–40)
BILIRUB SERPL-MCNC: 0.3 MG/DL (ref 0–1.2)
BUN SERPL-MCNC: 13 MG/DL (ref 8–27)
BUN/CREAT SERPL: 11 (ref 10–24)
CALCIUM SERPL-MCNC: 9.5 MG/DL (ref 8.6–10.2)
CHLORIDE SERPL-SCNC: 98 MMOL/L (ref 96–106)
CHOLEST SERPL-MCNC: 223 MG/DL (ref 100–199)
CO2 SERPL-SCNC: 21 MMOL/L (ref 20–29)
CREAT SERPL-MCNC: 1.2 MG/DL (ref 0.76–1.27)
EGFRCR SERPLBLD CKD-EPI 2021: 68 ML/MIN/1.73
GLOBULIN SER CALC-MCNC: 3 G/DL (ref 1.5–4.5)
GLUCOSE SERPL-MCNC: 113 MG/DL (ref 70–99)
HBA1C MFR BLD: 6.4 % (ref 4.8–5.6)
HCT VFR BLD AUTO: 46.6 % (ref 37.5–51)
HDLC SERPL-MCNC: 63 MG/DL
HGB BLD-MCNC: 15 G/DL (ref 13–17.7)
LDLC SERPL CALC-MCNC: 135 MG/DL (ref 0–99)
POTASSIUM SERPL-SCNC: 4.1 MMOL/L (ref 3.5–5.2)
PROT SERPL-MCNC: 7.2 G/DL (ref 6–8.5)
SODIUM SERPL-SCNC: 139 MMOL/L (ref 134–144)
TRIGL SERPL-MCNC: 142 MG/DL (ref 0–149)
TSH SERPL DL<=0.005 MIU/L-ACNC: 1.06 UIU/ML (ref 0.45–4.5)
VLDLC SERPL CALC-MCNC: 25 MG/DL (ref 5–40)

## 2024-02-25 RX ORDER — PRAVASTATIN SODIUM 40 MG
TABLET ORAL
Qty: 90 TABLET | Refills: 1 | Status: SHIPPED | OUTPATIENT
Start: 2024-02-25

## 2024-04-01 RX ORDER — BACLOFEN 10 MG/1
TABLET ORAL
Qty: 60 TABLET | Refills: 0 | Status: SHIPPED | OUTPATIENT
Start: 2024-04-01

## 2024-05-01 RX ORDER — VALSARTAN 160 MG/1
TABLET ORAL
Qty: 90 TABLET | Refills: 1 | Status: SHIPPED | OUTPATIENT
Start: 2024-05-01

## 2024-05-01 RX ORDER — TRIAMTERENE AND HYDROCHLOROTHIAZIDE 37.5; 25 MG/1; MG/1
TABLET ORAL
Qty: 90 TABLET | Refills: 1 | Status: SHIPPED | OUTPATIENT
Start: 2024-05-01

## 2024-05-01 RX ORDER — HYDROXYZINE HYDROCHLORIDE 25 MG/1
TABLET, FILM COATED ORAL
Qty: 90 TABLET | Refills: 0 | Status: SHIPPED | OUTPATIENT
Start: 2024-05-01

## 2024-05-16 ENCOUNTER — TELEPHONE (OUTPATIENT)
Facility: CLINIC | Age: 64
End: 2024-05-16

## 2024-05-16 NOTE — TELEPHONE ENCOUNTER
----- Message from Renetta Harvey sent at 5/16/2024  9:55 AM EDT -----  Subject: Message to Provider    QUESTIONS  Information for Provider? Pt would like to schedule a B12 injection.   Please call to advise.   ---------------------------------------------------------------------------  --------------  CALL BACK INFO  [phone]; OK to leave message on voicemail  ---------------------------------------------------------------------------  --------------  SCRIPT ANSWERS  Relationship to Patient? Spouse/Partner  Representative Name? Rebekah  Additional information verified (besides Name and Date of Birth)? Phone   Number  (Is the patient requesting to see the provider for a procedure?)? Yes

## 2024-05-24 RX ORDER — BACLOFEN 10 MG/1
TABLET ORAL
Qty: 60 TABLET | Refills: 0 | Status: SHIPPED | OUTPATIENT
Start: 2024-05-24

## 2024-05-29 NOTE — TELEPHONE ENCOUNTER
Pt called to request a refill on the below medication. Please send to [facility] in [place].         baclofen (LIORESAL) 10 MG tablet

## 2024-05-30 RX ORDER — BACLOFEN 10 MG/1
TABLET ORAL
Qty: 60 TABLET | Refills: 2 | Status: SHIPPED | OUTPATIENT
Start: 2024-05-30

## 2024-06-03 RX ORDER — AMLODIPINE BESYLATE 10 MG/1
TABLET ORAL
Qty: 90 TABLET | Refills: 0 | Status: SHIPPED | OUTPATIENT
Start: 2024-06-03

## 2024-06-24 RX ORDER — FAMOTIDINE 20 MG/1
TABLET, FILM COATED ORAL
Qty: 90 TABLET | Refills: 0 | Status: SHIPPED | OUTPATIENT
Start: 2024-06-24

## 2024-07-29 RX ORDER — HYDROXYZINE HYDROCHLORIDE 25 MG/1
TABLET, FILM COATED ORAL
Qty: 90 TABLET | Refills: 0 | Status: SHIPPED | OUTPATIENT
Start: 2024-07-29

## 2024-09-03 RX ORDER — AMLODIPINE BESYLATE 10 MG/1
TABLET ORAL
Qty: 90 TABLET | Refills: 0 | Status: SHIPPED | OUTPATIENT
Start: 2024-09-03

## 2024-09-17 ENCOUNTER — OFFICE VISIT (OUTPATIENT)
Facility: CLINIC | Age: 64
End: 2024-09-17

## 2024-09-17 VITALS
SYSTOLIC BLOOD PRESSURE: 121 MMHG | RESPIRATION RATE: 14 BRPM | WEIGHT: 154.2 LBS | HEART RATE: 75 BPM | HEIGHT: 67 IN | OXYGEN SATURATION: 95 % | BODY MASS INDEX: 24.2 KG/M2 | DIASTOLIC BLOOD PRESSURE: 83 MMHG | TEMPERATURE: 98.3 F

## 2024-09-17 DIAGNOSIS — Z23 ENCOUNTER FOR IMMUNIZATION: ICD-10-CM

## 2024-09-17 DIAGNOSIS — R73.01 IMPAIRED FASTING GLUCOSE: ICD-10-CM

## 2024-09-17 DIAGNOSIS — E78.49 OTHER HYPERLIPIDEMIA: ICD-10-CM

## 2024-09-17 DIAGNOSIS — I69.954 HEMIPLEGIA AND HEMIPARESIS FOLLOWING UNSPECIFIED CEREBROVASCULAR DISEASE AFFECTING LEFT NON-DOMINANT SIDE (HCC): ICD-10-CM

## 2024-09-17 DIAGNOSIS — I10 ESSENTIAL (PRIMARY) HYPERTENSION: Primary | ICD-10-CM

## 2024-09-17 SDOH — ECONOMIC STABILITY: FOOD INSECURITY: WITHIN THE PAST 12 MONTHS, YOU WORRIED THAT YOUR FOOD WOULD RUN OUT BEFORE YOU GOT MONEY TO BUY MORE.: NEVER TRUE

## 2024-09-17 SDOH — ECONOMIC STABILITY: FOOD INSECURITY: WITHIN THE PAST 12 MONTHS, THE FOOD YOU BOUGHT JUST DIDN'T LAST AND YOU DIDN'T HAVE MONEY TO GET MORE.: NEVER TRUE

## 2024-09-17 SDOH — ECONOMIC STABILITY: INCOME INSECURITY: HOW HARD IS IT FOR YOU TO PAY FOR THE VERY BASICS LIKE FOOD, HOUSING, MEDICAL CARE, AND HEATING?: NOT HARD AT ALL

## 2024-09-18 LAB
ALBUMIN SERPL-MCNC: 4.6 G/DL (ref 3.9–4.9)
ALP SERPL-CCNC: 82 IU/L (ref 44–121)
ALT SERPL-CCNC: 18 IU/L (ref 0–44)
AST SERPL-CCNC: 21 IU/L (ref 0–40)
BILIRUB SERPL-MCNC: 0.2 MG/DL (ref 0–1.2)
BUN SERPL-MCNC: 15 MG/DL (ref 8–27)
BUN/CREAT SERPL: 14 (ref 10–24)
CALCIUM SERPL-MCNC: 9.9 MG/DL (ref 8.6–10.2)
CHLORIDE SERPL-SCNC: 97 MMOL/L (ref 96–106)
CHOLEST SERPL-MCNC: 259 MG/DL (ref 100–199)
CO2 SERPL-SCNC: 25 MMOL/L (ref 20–29)
CREAT SERPL-MCNC: 1.04 MG/DL (ref 0.76–1.27)
EGFRCR SERPLBLD CKD-EPI 2021: 81 ML/MIN/1.73
GLOBULIN SER CALC-MCNC: 3.4 G/DL (ref 1.5–4.5)
GLUCOSE SERPL-MCNC: 96 MG/DL (ref 70–99)
HBA1C MFR BLD: 6.3 % (ref 4.8–5.6)
HCT VFR BLD AUTO: 47.4 % (ref 37.5–51)
HDLC SERPL-MCNC: 62 MG/DL
HGB BLD-MCNC: 15.7 G/DL (ref 13–17.7)
LDLC SERPL CALC-MCNC: 171 MG/DL (ref 0–99)
POTASSIUM SERPL-SCNC: 4.6 MMOL/L (ref 3.5–5.2)
PROT SERPL-MCNC: 8 G/DL (ref 6–8.5)
SODIUM SERPL-SCNC: 136 MMOL/L (ref 134–144)
TRIGL SERPL-MCNC: 143 MG/DL (ref 0–149)
TSH SERPL DL<=0.005 MIU/L-ACNC: 0.32 UIU/ML (ref 0.45–4.5)
VLDLC SERPL CALC-MCNC: 26 MG/DL (ref 5–40)

## 2024-09-22 RX ORDER — FAMOTIDINE 20 MG/1
TABLET, FILM COATED ORAL
Qty: 90 TABLET | Refills: 1 | Status: SHIPPED | OUTPATIENT
Start: 2024-09-22

## 2024-09-22 RX ORDER — PRAVASTATIN SODIUM 40 MG
TABLET ORAL
Qty: 90 TABLET | Refills: 1 | Status: SHIPPED | OUTPATIENT
Start: 2024-09-22

## 2024-10-11 ENCOUNTER — OFFICE VISIT (OUTPATIENT)
Facility: CLINIC | Age: 64
End: 2024-10-11
Payer: MEDICAID

## 2024-10-11 VITALS
TEMPERATURE: 97.5 F | HEIGHT: 67 IN | SYSTOLIC BLOOD PRESSURE: 123 MMHG | WEIGHT: 156 LBS | RESPIRATION RATE: 14 BRPM | OXYGEN SATURATION: 96 % | DIASTOLIC BLOOD PRESSURE: 83 MMHG | HEART RATE: 78 BPM | BODY MASS INDEX: 24.48 KG/M2

## 2024-10-11 DIAGNOSIS — M79.604 LEG PAIN, POSTERIOR, RIGHT: Primary | ICD-10-CM

## 2024-10-11 PROCEDURE — 3079F DIAST BP 80-89 MM HG: CPT | Performed by: FAMILY MEDICINE

## 2024-10-11 PROCEDURE — 99213 OFFICE O/P EST LOW 20 MIN: CPT | Performed by: FAMILY MEDICINE

## 2024-10-11 PROCEDURE — 3074F SYST BP LT 130 MM HG: CPT | Performed by: FAMILY MEDICINE

## 2024-10-11 RX ORDER — DICLOFENAC SODIUM 75 MG/1
75 TABLET, DELAYED RELEASE ORAL 2 TIMES DAILY PRN
Qty: 60 TABLET | Refills: 0 | Status: SHIPPED | OUTPATIENT
Start: 2024-10-11

## 2024-10-11 NOTE — PROGRESS NOTES
Chief Complaint   Patient presents with    Leg Pain     Right leg pain starting yesterday, muscle tightness        \"Have you been to the ER, urgent care clinic since your last visit?  Hospitalized since your last visit?\"    NO    “Have you seen or consulted any other health care providers outside of [facility] since your last visit?”    NO            Click Here for Release of Records Request     Health Maintenance Due   Topic Date Due    Pneumococcal 0-64 years Vaccine (1 of 2 - PCV) Never done    HIV screen  Never done    DTaP/Tdap/Td vaccine (1 - Tdap) Never done    Shingles vaccine (1 of 2) Never done    Respiratory Syncytial Virus (RSV) Pregnant or age 60 yrs+ (1 - 1-dose 60+ series) Never done    COVID-19 Vaccine (4 - 2023-24 season) 09/01/2024

## 2024-10-18 NOTE — PROGRESS NOTES
Progress Note    Patient: Elliot Carranza MRN: [id number]  SSN: [id number]    YOB: 1960  Age: 63 y.o.  Sex: male        Chief Complaint   Patient presents with    Leg Pain     Right leg pain starting yesterday, muscle tightness in calf      he is a 63 y.o. year old male who presents with concern about right leg pain and tightness. Patient with dx of CVA with left sided hemiparesis, HTN, HLD and OA. BP well controlled.     Encounter Diagnoses   Name Primary?    Leg pain, posterior, right Yes     BP Readings from Last 3 Encounters:   10/11/24 123/83   09/17/24 121/83   02/22/24 103/67     Wt Readings from Last 3 Encounters:   10/11/24 70.8 kg (156 lb)   09/17/24 69.9 kg (154 lb 3.2 oz)   02/22/24 75.7 kg (166 lb 12.8 oz)     Body mass index is 24.43 kg/m².    Lab Results   Component Value Date    CHOL 259 (H) 09/17/2024    TRIG 143 09/17/2024    HDL 62 09/17/2024     (H) 09/17/2024    VLDL 26 09/17/2024     CMP:    Lab Results   Component Value Date/Time     09/17/2024 12:00 AM    K 4.6 09/17/2024 12:00 AM    CL 97 09/17/2024 12:00 AM    CO2 25 09/17/2024 12:00 AM    BUN 15 09/17/2024 12:00 AM    CREATININE 1.04 09/17/2024 12:00 AM    GFRAA 81 01/28/2022 12:00 AM    AGRATIO 1.4 02/22/2024 12:00 AM    LABGLOM 81 09/17/2024 12:00 AM    LABGLOM 68 02/22/2024 12:00 AM    GLUCOSE 96 09/17/2024 12:00 AM    CALCIUM 9.9 09/17/2024 12:00 AM    BILITOT 0.2 09/17/2024 12:00 AM    ALKPHOS 82 09/17/2024 12:00 AM    AST 21 09/17/2024 12:00 AM    ALT 18 09/17/2024 12:00 AM     HgBA1c:    Lab Results   Component Value Date/Time    LABA1C 6.3 09/17/2024 12:00 AM           Patient Active Problem List   Diagnosis    Neutropenia (HCC)    Knee pain    H/O: CVA (cerebrovascular accident)    CHF (congestive heart failure) (HCC)    Alcohol abuse    HTN (hypertension)    Cough    Arthritis of knee    GERD (gastroesophageal reflux disease)    Osteoarthritis, knee    Paralysis (HCC)    Hyperlipidemia     Past

## 2024-10-25 RX ORDER — TRIAMTERENE AND HYDROCHLOROTHIAZIDE 37.5; 25 MG/1; MG/1
TABLET ORAL
Qty: 90 TABLET | Refills: 1 | Status: SHIPPED | OUTPATIENT
Start: 2024-10-25

## 2024-10-25 RX ORDER — HYDROXYZINE HYDROCHLORIDE 25 MG/1
TABLET, FILM COATED ORAL
Qty: 90 TABLET | Refills: 0 | Status: SHIPPED | OUTPATIENT
Start: 2024-10-25

## 2024-10-25 RX ORDER — VALSARTAN 160 MG/1
TABLET ORAL
Qty: 90 TABLET | Refills: 1 | Status: SHIPPED | OUTPATIENT
Start: 2024-10-25

## 2024-11-29 RX ORDER — AMLODIPINE BESYLATE 10 MG/1
TABLET ORAL
Qty: 90 TABLET | Refills: 0 | Status: SHIPPED | OUTPATIENT
Start: 2024-11-29

## 2024-11-29 RX ORDER — BACLOFEN 10 MG/1
TABLET ORAL
Qty: 60 TABLET | Refills: 0 | Status: SHIPPED | OUTPATIENT
Start: 2024-11-29

## 2024-12-30 RX ORDER — BACLOFEN 10 MG/1
TABLET ORAL
Qty: 60 TABLET | Refills: 0 | Status: SHIPPED | OUTPATIENT
Start: 2024-12-30

## 2025-01-25 RX ORDER — HYDROXYZINE HYDROCHLORIDE 25 MG/1
TABLET, FILM COATED ORAL
Qty: 90 TABLET | Refills: 0 | Status: SHIPPED | OUTPATIENT
Start: 2025-01-25

## 2025-02-10 RX ORDER — BACLOFEN 10 MG/1
TABLET ORAL
Qty: 60 TABLET | Refills: 2 | Status: SHIPPED | OUTPATIENT
Start: 2025-02-10

## 2025-02-20 RX ORDER — AMLODIPINE BESYLATE 10 MG/1
TABLET ORAL
Qty: 90 TABLET | Refills: 0 | Status: SHIPPED | OUTPATIENT
Start: 2025-02-20

## 2025-03-17 RX ORDER — FAMOTIDINE 20 MG/1
20 TABLET, FILM COATED ORAL NIGHTLY
Qty: 90 TABLET | Refills: 0 | Status: SHIPPED | OUTPATIENT
Start: 2025-03-17

## 2025-03-21 ENCOUNTER — OFFICE VISIT (OUTPATIENT)
Facility: CLINIC | Age: 65
End: 2025-03-21
Payer: MEDICAID

## 2025-03-21 VITALS
BODY MASS INDEX: 26.21 KG/M2 | OXYGEN SATURATION: 93 % | HEART RATE: 56 BPM | RESPIRATION RATE: 16 BRPM | DIASTOLIC BLOOD PRESSURE: 84 MMHG | HEIGHT: 67 IN | SYSTOLIC BLOOD PRESSURE: 132 MMHG | TEMPERATURE: 97.8 F | WEIGHT: 167 LBS

## 2025-03-21 DIAGNOSIS — E78.49 OTHER HYPERLIPIDEMIA: ICD-10-CM

## 2025-03-21 DIAGNOSIS — I10 ESSENTIAL (PRIMARY) HYPERTENSION: Primary | ICD-10-CM

## 2025-03-21 DIAGNOSIS — R73.01 IMPAIRED FASTING GLUCOSE: ICD-10-CM

## 2025-03-21 DIAGNOSIS — R79.89 ELEVATED TSH: ICD-10-CM

## 2025-03-21 PROCEDURE — 3075F SYST BP GE 130 - 139MM HG: CPT | Performed by: FAMILY MEDICINE

## 2025-03-21 PROCEDURE — 3079F DIAST BP 80-89 MM HG: CPT | Performed by: FAMILY MEDICINE

## 2025-03-21 PROCEDURE — 99214 OFFICE O/P EST MOD 30 MIN: CPT | Performed by: FAMILY MEDICINE

## 2025-03-21 RX ORDER — PRAVASTATIN SODIUM 40 MG
TABLET ORAL
Qty: 90 TABLET | Refills: 1 | Status: SHIPPED | OUTPATIENT
Start: 2025-03-21

## 2025-03-21 SDOH — ECONOMIC STABILITY: FOOD INSECURITY: WITHIN THE PAST 12 MONTHS, YOU WORRIED THAT YOUR FOOD WOULD RUN OUT BEFORE YOU GOT MONEY TO BUY MORE.: NEVER TRUE

## 2025-03-21 SDOH — ECONOMIC STABILITY: FOOD INSECURITY: WITHIN THE PAST 12 MONTHS, THE FOOD YOU BOUGHT JUST DIDN'T LAST AND YOU DIDN'T HAVE MONEY TO GET MORE.: NEVER TRUE

## 2025-03-21 ASSESSMENT — PATIENT HEALTH QUESTIONNAIRE - PHQ9
1. LITTLE INTEREST OR PLEASURE IN DOING THINGS: NOT AT ALL
2. FEELING DOWN, DEPRESSED OR HOPELESS: NOT AT ALL
SUM OF ALL RESPONSES TO PHQ QUESTIONS 1-9: 0

## 2025-03-23 LAB
ALBUMIN SERPL-MCNC: 4.8 G/DL (ref 3.9–4.9)
ALP SERPL-CCNC: 82 IU/L (ref 44–121)
ALT SERPL-CCNC: 23 IU/L (ref 0–44)
AST SERPL-CCNC: 27 IU/L (ref 0–40)
BILIRUB SERPL-MCNC: 0.3 MG/DL (ref 0–1.2)
BUN SERPL-MCNC: 14 MG/DL (ref 8–27)
BUN/CREAT SERPL: 11 (ref 10–24)
CALCIUM SERPL-MCNC: 9.6 MG/DL (ref 8.6–10.2)
CHLORIDE SERPL-SCNC: 97 MMOL/L (ref 96–106)
CHOLEST SERPL-MCNC: 228 MG/DL (ref 100–199)
CO2 SERPL-SCNC: 20 MMOL/L (ref 20–29)
CREAT SERPL-MCNC: 1.26 MG/DL (ref 0.76–1.27)
EGFRCR SERPLBLD CKD-EPI 2021: 64 ML/MIN/1.73
ERYTHROCYTE [DISTWIDTH] IN BLOOD BY AUTOMATED COUNT: 12.9 % (ref 11.6–15.4)
GLOBULIN SER CALC-MCNC: 3.1 G/DL (ref 1.5–4.5)
GLUCOSE SERPL-MCNC: 104 MG/DL (ref 70–99)
HCT VFR BLD AUTO: 48 % (ref 37.5–51)
HDLC SERPL-MCNC: 87 MG/DL
HGB BLD-MCNC: 15.4 G/DL (ref 13–17.7)
LDLC SERPL CALC-MCNC: 120 MG/DL (ref 0–99)
MCH RBC QN AUTO: 30.4 PG (ref 26.6–33)
MCHC RBC AUTO-ENTMCNC: 32.1 G/DL (ref 31.5–35.7)
MCV RBC AUTO: 95 FL (ref 79–97)
PLATELET # BLD AUTO: 228 X10E3/UL (ref 150–450)
POTASSIUM SERPL-SCNC: 3.8 MMOL/L (ref 3.5–5.2)
PROT SERPL-MCNC: 7.9 G/DL (ref 6–8.5)
RBC # BLD AUTO: 5.07 X10E6/UL (ref 4.14–5.8)
SODIUM SERPL-SCNC: 139 MMOL/L (ref 134–144)
T4 FREE SERPL-MCNC: 1.17 NG/DL (ref 0.82–1.77)
TRIGL SERPL-MCNC: 121 MG/DL (ref 0–149)
TSH SERPL DL<=0.005 MIU/L-ACNC: 1.04 UIU/ML (ref 0.45–4.5)
VLDLC SERPL CALC-MCNC: 21 MG/DL (ref 5–40)
WBC # BLD AUTO: 4.8 X10E3/UL (ref 3.4–10.8)

## 2025-03-24 ENCOUNTER — RESULTS FOLLOW-UP (OUTPATIENT)
Facility: CLINIC | Age: 65
End: 2025-03-24

## 2025-03-24 LAB — HBA1C MFR BLD: 6.7 % (ref 4.8–5.6)

## 2025-03-25 NOTE — PROGRESS NOTES
Chief Complaint   Patient presents with    6 Month Follow-Up        \"Have you been to the ER, urgent care clinic since your last visit?  Hospitalized since your last visit?\"    NO    “Have you seen or consulted any other health care providers outside of [facility] since your last visit?”    NO            Click Here for Release of Records Request     Health Maintenance Due   Topic Date Due    HIV screen  Never done    DTaP/Tdap/Td vaccine (1 - Tdap) Never done    Pneumococcal 50+ years Vaccine (1 of 2 - PCV) Never done    Shingles vaccine (1 of 2) Never done    Respiratory Syncytial Virus (RSV) Pregnant or age 60 yrs+ (1 - Risk 60-74 years 1-dose series) Never done    COVID-19 Vaccine (4 - 2024-25 season) 09/01/2024    Depression Screen  02/22/2025       
sided weakness, antalgic gait  Neurologic: No focal deficits  Psych: Active, alert and oriented. Affect appropriate      Diagnosis Orders   1. Essential (primary) hypertension  TSH    Comprehensive Metabolic Panel    CBC    CBC    Comprehensive Metabolic Panel    TSH      2. Other hyperlipidemia  pravastatin (PRAVACHOL) 40 MG tablet    Lipid Panel    Comprehensive Metabolic Panel    Comprehensive Metabolic Panel    Lipid Panel      3. Impaired fasting glucose  Hemoglobin A1C    Hemoglobin A1C      4. Elevated TSH  T4, Free    T4, Free          Plan of care:  Diagnoses were discussed in detail with patient.   Medications reviewed and appropriate.   Patient to continue current prescribed medications as written.    Medication risks/benefits/side effects discussed with patient.   All of the patient's questions were addressed and answered to apparent satisfaction.   The patient understands and agrees with our plan of care.  The patient knows to call back if they have questions about the plan of care or if symptoms change.  The patient received an After-Visit Summary which contains VS, diagnoses, orders, allergy and medication lists.      No future appointments.        Follow-up and Dispositions    Return in about 6 months (around 9/21/2025).

## 2025-04-18 NOTE — TELEPHONE ENCOUNTER
hydrOXYzine HCl (ATARAX) 25 MG tablet    amLODIPine (NORVASC) 10 MG tablet   valsartan (DIOVAN) 160 MG tablet   triamterene-hydroCHLOROthiazide (MAXZIDE-25) 37.5-25 MG per tablet   Pharmacists states pt will like meds refilled and sent to [facility]. Please advise.

## 2025-04-20 RX ORDER — HYDROXYZINE HYDROCHLORIDE 25 MG/1
25 TABLET, FILM COATED ORAL EVERY 8 HOURS PRN
Qty: 90 TABLET | Refills: 0 | Status: SHIPPED | OUTPATIENT
Start: 2025-04-20

## 2025-04-20 RX ORDER — AMLODIPINE BESYLATE 10 MG/1
10 TABLET ORAL DAILY
Qty: 90 TABLET | Refills: 0 | Status: SHIPPED | OUTPATIENT
Start: 2025-04-20

## 2025-04-20 RX ORDER — TRIAMTERENE AND HYDROCHLOROTHIAZIDE 37.5; 25 MG/1; MG/1
1 TABLET ORAL DAILY
Qty: 90 TABLET | Refills: 1 | Status: SHIPPED | OUTPATIENT
Start: 2025-04-20

## 2025-04-20 RX ORDER — VALSARTAN 160 MG/1
160 TABLET ORAL DAILY
Qty: 90 TABLET | Refills: 1 | Status: SHIPPED | OUTPATIENT
Start: 2025-04-20

## 2025-04-25 ENCOUNTER — TELEPHONE (OUTPATIENT)
Facility: CLINIC | Age: 65
End: 2025-04-25

## 2025-04-25 RX ORDER — AMLODIPINE BESYLATE 10 MG/1
10 TABLET ORAL DAILY
Qty: 90 TABLET | Refills: 1 | Status: SHIPPED | OUTPATIENT
Start: 2025-04-25

## 2025-04-25 RX ORDER — VALSARTAN 160 MG/1
160 TABLET ORAL DAILY
Qty: 90 TABLET | Refills: 1 | Status: SHIPPED | OUTPATIENT
Start: 2025-04-25

## 2025-04-25 RX ORDER — HYDROXYZINE HYDROCHLORIDE 25 MG/1
25 TABLET, FILM COATED ORAL EVERY 8 HOURS PRN
Qty: 90 TABLET | Refills: 0 | Status: SHIPPED | OUTPATIENT
Start: 2025-04-25

## 2025-04-25 RX ORDER — TRIAMTERENE AND HYDROCHLOROTHIAZIDE 37.5; 25 MG/1; MG/1
1 TABLET ORAL DAILY
Qty: 90 TABLET | Refills: 1 | Status: SHIPPED | OUTPATIENT
Start: 2025-04-25

## 2025-04-25 NOTE — TELEPHONE ENCOUNTER
hydrOXYzine HCl (ATARAX) 25 MG tablet   valsartan (DIOVAN) 160 MG tablet   triamterene-hydroCHLOROthiazide (MAXZIDE-25) 37.5-25 MG per tablet   amLODIPine (NORVASC) 10 MG tablet   Pt would like his medications to be sent to [facility]. Pt does not want to use [facility].

## 2025-05-07 RX ORDER — BACLOFEN 10 MG/1
TABLET ORAL
Qty: 60 TABLET | Refills: 2 | Status: SHIPPED | OUTPATIENT
Start: 2025-05-07

## 2025-05-07 RX ORDER — FAMOTIDINE 20 MG/1
20 TABLET, FILM COATED ORAL NIGHTLY
Qty: 90 TABLET | Refills: 1 | Status: SHIPPED | OUTPATIENT
Start: 2025-05-07

## 2025-05-07 NOTE — TELEPHONE ENCOUNTER
famotidine (PEPCID) 20 MG tablet    baclofen (LIORESAL) 10 MG tablet   Please send to [facility]

## 2025-05-29 ENCOUNTER — OFFICE VISIT (OUTPATIENT)
Facility: CLINIC | Age: 65
End: 2025-05-29
Payer: MEDICAID

## 2025-05-29 VITALS
WEIGHT: 168.4 LBS | OXYGEN SATURATION: 95 % | RESPIRATION RATE: 18 BRPM | BODY MASS INDEX: 26.43 KG/M2 | HEIGHT: 67 IN | SYSTOLIC BLOOD PRESSURE: 125 MMHG | HEART RATE: 50 BPM | DIASTOLIC BLOOD PRESSURE: 86 MMHG | TEMPERATURE: 98.5 F

## 2025-05-29 DIAGNOSIS — I10 ESSENTIAL (PRIMARY) HYPERTENSION: Primary | ICD-10-CM

## 2025-05-29 PROCEDURE — 3074F SYST BP LT 130 MM HG: CPT | Performed by: FAMILY MEDICINE

## 2025-05-29 PROCEDURE — 99214 OFFICE O/P EST MOD 30 MIN: CPT | Performed by: FAMILY MEDICINE

## 2025-05-29 PROCEDURE — 3078F DIAST BP <80 MM HG: CPT | Performed by: FAMILY MEDICINE

## 2025-05-29 RX ORDER — AMLODIPINE BESYLATE 10 MG/1
10 TABLET ORAL DAILY
Qty: 90 TABLET | Refills: 1 | Status: SHIPPED | OUTPATIENT
Start: 2025-05-29

## 2025-05-29 NOTE — PROGRESS NOTES
No chief complaint on file.     \"Have you been to the ER, urgent care clinic since your last visit?  Hospitalized since your last visit?\"    NO    “Have you seen or consulted any other health care providers outside of [facility] since your last visit?”    NO            Click Here for Release of Records Request     Health Maintenance Due   Topic Date Due    HIV screen  Never done    DTaP/Tdap/Td vaccine (1 - Tdap) Never done    Pneumococcal 50+ years Vaccine (1 of 2 - PCV) Never done    Shingles vaccine (1 of 2) Never done    Respiratory Syncytial Virus (RSV) Pregnant or age 60 yrs+ (1 - Risk 60-74 years 1-dose series) Never done    COVID-19 Vaccine (4 - 2024-25 season) 09/01/2024    A1C test (Diabetic or Prediabetic)  06/21/2025

## 2025-06-27 ENCOUNTER — TELEPHONE (OUTPATIENT)
Facility: CLINIC | Age: 65
End: 2025-06-27

## 2025-06-27 NOTE — TELEPHONE ENCOUNTER
famotidine (PEPCID) 20 MG tablet   baclofen (LIORESAL) 10 MG tablet   Pt states he needs refill on meds. Pt states he does not understand why his out of his Famotidine. States he cut his Famotidine in half and take half a tablet. Pt will like meds to go to [facility]. Please advise.

## 2025-07-14 ENCOUNTER — OFFICE VISIT (OUTPATIENT)
Facility: CLINIC | Age: 65
End: 2025-07-14
Payer: MEDICAID

## 2025-07-14 VITALS
OXYGEN SATURATION: 94 % | HEART RATE: 59 BPM | SYSTOLIC BLOOD PRESSURE: 126 MMHG | HEIGHT: 67 IN | TEMPERATURE: 98.6 F | WEIGHT: 167.8 LBS | BODY MASS INDEX: 26.34 KG/M2 | DIASTOLIC BLOOD PRESSURE: 72 MMHG

## 2025-07-14 DIAGNOSIS — S22.32XD: Primary | ICD-10-CM

## 2025-07-14 DIAGNOSIS — I69.954 HEMIPLEGIA AND HEMIPARESIS FOLLOWING UNSPECIFIED CEREBROVASCULAR DISEASE AFFECTING LEFT NON-DOMINANT SIDE (HCC): ICD-10-CM

## 2025-07-14 DIAGNOSIS — I71.21 ANEURYSM OF ASCENDING AORTA WITHOUT RUPTURE: ICD-10-CM

## 2025-07-14 DIAGNOSIS — E78.49 OTHER HYPERLIPIDEMIA: ICD-10-CM

## 2025-07-14 DIAGNOSIS — I25.10 CORONARY ARTERY CALCIFICATION: ICD-10-CM

## 2025-07-14 DIAGNOSIS — I10 ESSENTIAL (PRIMARY) HYPERTENSION: ICD-10-CM

## 2025-07-14 PROCEDURE — 93000 ELECTROCARDIOGRAM COMPLETE: CPT | Performed by: FAMILY MEDICINE

## 2025-07-14 PROCEDURE — 99214 OFFICE O/P EST MOD 30 MIN: CPT | Performed by: FAMILY MEDICINE

## 2025-07-14 PROCEDURE — 3078F DIAST BP <80 MM HG: CPT | Performed by: FAMILY MEDICINE

## 2025-07-14 PROCEDURE — 3074F SYST BP LT 130 MM HG: CPT | Performed by: FAMILY MEDICINE

## 2025-07-14 RX ORDER — LIDOCAINE 50 MG/G
1 PATCH TOPICAL DAILY
COMMUNITY
Start: 2025-07-12 | End: 2025-07-17

## 2025-07-14 RX ORDER — ATORVASTATIN CALCIUM 40 MG/1
40 TABLET, FILM COATED ORAL DAILY
Qty: 90 TABLET | Refills: 0 | Status: SHIPPED | OUTPATIENT
Start: 2025-07-14

## 2025-07-14 RX ORDER — KETOROLAC TROMETHAMINE 10 MG/1
10 TABLET, FILM COATED ORAL EVERY 6 HOURS PRN
COMMUNITY
Start: 2025-07-12 | End: 2025-07-17

## 2025-07-14 RX ORDER — OXYCODONE HYDROCHLORIDE 5 MG/1
5 TABLET ORAL EVERY 6 HOURS PRN
COMMUNITY
Start: 2025-07-12 | End: 2025-07-14

## 2025-07-14 NOTE — PROGRESS NOTES
Chief Complaint   Patient presents with    Follow-up     [facility] ER 7-12-25      \"Have you been to the ER, urgent care clinic since your last visit?  Hospitalized since your last visit?\"    YES, [facility] ER 7-12-25    “Have you seen or consulted any other health care providers outside of [facility] since your last visit?”    NO            Click Here for Release of Records Request     Health Maintenance Due   Topic Date Due    HIV screen  Never done    DTaP/Tdap/Td vaccine (1 - Tdap) Never done    Pneumococcal 50+ years Vaccine (1 of 2 - PCV) Never done    Shingles vaccine (1 of 2) Never done    Respiratory Syncytial Virus (RSV) Pregnant or age 60 yrs+ (1 - Risk 60-74 years 1-dose series) Never done    COVID-19 Vaccine (4 - 2024-25 season) 09/01/2024    A1C test (Diabetic or Prediabetic)  06/21/2025

## 2025-07-15 ENCOUNTER — TELEPHONE (OUTPATIENT)
Facility: CLINIC | Age: 65
End: 2025-07-15

## 2025-07-15 DIAGNOSIS — S22.32XD: Primary | ICD-10-CM

## 2025-07-15 DIAGNOSIS — I71.21 ASCENDING AORTIC ANEURYSM, UNSPECIFIED WHETHER RUPTURED: Primary | ICD-10-CM

## 2025-07-15 NOTE — TELEPHONE ENCOUNTER
Pt needs a refill on the following medication:    -oxyCODONE (ROXICODONE) 5 MG     Please advise...

## 2025-07-17 NOTE — TELEPHONE ENCOUNTER
Pt states he is still dealing with pain in his ribs. Pt would like for the pcp to please send in something for pain relief, OTC meds are not helping. Please advise.

## 2025-07-18 RX ORDER — HYDROCODONE BITARTRATE AND ACETAMINOPHEN 5; 325 MG/1; MG/1
1 TABLET ORAL EVERY 8 HOURS PRN
Qty: 15 TABLET | Refills: 0 | Status: SHIPPED | OUTPATIENT
Start: 2025-07-18 | End: 2025-07-23

## 2025-08-15 ENCOUNTER — TELEPHONE (OUTPATIENT)
Facility: CLINIC | Age: 65
End: 2025-08-15

## 2025-08-15 RX ORDER — FAMOTIDINE 40 MG/1
40 TABLET, FILM COATED ORAL NIGHTLY
Qty: 90 TABLET | Refills: 0 | Status: SHIPPED | OUTPATIENT
Start: 2025-08-15

## (undated) DEVICE — CATH IV AUTOGRD BC BLU 22GA 25 -- INSYTE

## (undated) DEVICE — NDL PRT INJ NSAF BLNT 18GX1.5 --

## (undated) DEVICE — NDL FLTR TIP 5 MIC 18GX1.5IN --

## (undated) DEVICE — SOLIDIFIER MEDC 1200ML -- CONVERT TO 356117

## (undated) DEVICE — SET ADMIN 16ML TBNG L100IN 2 Y INJ SITE IV PIGGY BK DISP

## (undated) DEVICE — BASIN EMSIS 16OZ GRAPHITE PLAS KID SHP MOLD GRAD FOR ORAL

## (undated) DEVICE — SYR 3ML LL TIP 1/10ML GRAD --

## (undated) DEVICE — Device

## (undated) DEVICE — ELECTRODE,RADIOTRANSLUCENT,FOAM,3PK: Brand: MEDLINE

## (undated) DEVICE — 1200 GUARD II KIT W/5MM TUBE W/O VAC TUBE: Brand: GUARDIAN

## (undated) DEVICE — SYR 5ML 1/5 GRAD LL NSAF LF --

## (undated) DEVICE — KIT COLON W/ 1.1OZ LUB AND 2 END

## (undated) DEVICE — BAG BELONG PT PERS CLEAR HANDL